# Patient Record
Sex: FEMALE | Race: BLACK OR AFRICAN AMERICAN | NOT HISPANIC OR LATINO | URBAN - METROPOLITAN AREA
[De-identification: names, ages, dates, MRNs, and addresses within clinical notes are randomized per-mention and may not be internally consistent; named-entity substitution may affect disease eponyms.]

---

## 2017-03-01 ENCOUNTER — EMERGENCY (EMERGENCY)
Facility: HOSPITAL | Age: 62
LOS: 1 days | Discharge: HOME | End: 2017-03-01
Attending: EMERGENCY MEDICINE | Admitting: EMERGENCY MEDICINE
Payer: COMMERCIAL

## 2017-03-01 VITALS
TEMPERATURE: 103 F | WEIGHT: 197.98 LBS | DIASTOLIC BLOOD PRESSURE: 63 MMHG | HEART RATE: 94 BPM | SYSTOLIC BLOOD PRESSURE: 100 MMHG | RESPIRATION RATE: 16 BRPM | OXYGEN SATURATION: 98 % | HEIGHT: 66 IN

## 2017-03-01 DIAGNOSIS — R50.9 FEVER, UNSPECIFIED: ICD-10-CM

## 2017-03-01 DIAGNOSIS — I25.2 OLD MYOCARDIAL INFARCTION: ICD-10-CM

## 2017-03-01 DIAGNOSIS — J11.00 INFLUENZA DUE TO UNIDENTIFIED INFLUENZA VIRUS WITH UNSPECIFIED TYPE OF PNEUMONIA: ICD-10-CM

## 2017-03-01 DIAGNOSIS — Z79.82 LONG TERM (CURRENT) USE OF ASPIRIN: ICD-10-CM

## 2017-03-01 PROCEDURE — 99284 EMERGENCY DEPT VISIT MOD MDM: CPT | Mod: 25

## 2017-03-01 RX ORDER — ACETAMINOPHEN 500 MG
1000 TABLET ORAL ONCE
Qty: 0 | Refills: 0 | Status: COMPLETED | OUTPATIENT
Start: 2017-03-01 | End: 2017-03-02

## 2017-03-01 RX ORDER — ALBUTEROL 90 UG/1
2.5 AEROSOL, METERED ORAL
Qty: 0 | Refills: 0 | Status: COMPLETED | OUTPATIENT
Start: 2017-03-01 | End: 2017-03-02

## 2017-03-01 RX ORDER — SODIUM CHLORIDE 9 MG/ML
1000 INJECTION INTRAMUSCULAR; INTRAVENOUS; SUBCUTANEOUS ONCE
Qty: 0 | Refills: 0 | Status: COMPLETED | OUTPATIENT
Start: 2017-03-01 | End: 2017-03-01

## 2017-03-01 NOTE — ED ADULT NURSE NOTE - DISCHARGE TEACHING
pt to take zithromax and tamiflu as directed and us inhaler as directed for sob/wheezing, pt to return if s/s worsen.

## 2017-03-01 NOTE — ED PROVIDER NOTE - PLAN OF CARE
Please take Tamiflu for 5 days as instructed.  In addition, please take Azithromycin for 4 more days as instructed.  Please follow up with your PMD within 24-48 hrs for further evaluation.  If you experience any worsening symptoms, please report to the ED ASAP. Please take Tamiflu for 5 days as instructed.  In addition, please take Azithromycin for 4 more days as instructed.  Please use the albuterol inhaler every 4-6hrs as needed for respiratory symptoms.  Please follow up with your PMD within 24-48 hrs for further evaluation.  If you experience any worsening symptoms, please report to the ED ASAP.

## 2017-03-01 NOTE — ED PROVIDER NOTE - OBJECTIVE STATEMENT
61M PMH CAD s/p MI, cardiomyopathy p/w 3 day hx of fever, chills.  Also reports prod cough (brown sputum), SOB, sore throat, congestion, chest pain, nausea, inc. urinary frequency.  No recent travel or sick contacts.  Has not taken anything for symptom control.

## 2017-03-01 NOTE — ED ADULT NURSE NOTE - OBJECTIVE STATEMENT
61 y,o female pmh MI, TIA, and cardiomyopathy c/o fever, chills, cough and congestion x 3 days. pt states she has had fever, nausea, chills, congestion and cough for approx 3 days now with sob. describes cough as productive with dark brown sputum and occasional sob. states her chest hurts d/t the increased amount of coughing. pt did not receive flu shot, arrives to ED with oral temp of 102.9, did not take anything for the fever today. congested cough noted upon assessment. pt states she has had + sick contacts at work over the passed week. denies any difficulty breathing, vomiting, diarrhea, decreased PO intake, dizziness, or burning upon urination. pt placed on droplet precautions, flu swab obtained. IV tylenol administered. pending labs. safety and fall precautions maintained.

## 2017-03-01 NOTE — ED PROVIDER NOTE - ATTENDING CONTRIBUTION TO CARE
Patient presenting c/o generalized malaise, fevers, cough productive of brown sputum, sore throat, myalgias.  Tried tylenol this AM with some relief.  Reporting sick co-workers.  Symptoms ongoing x3 days.    On exam patient febrile but VS otherwise WNL.  Ill appearing but non toxic.  RRR S1/S2, slight expiratory wheezing with bronchospastic cough appreciated.  Abdomen soft, NT, ND.  No noted rashes.    History and exam suggestive of possible viral etiology, given fevers, age and underlying medical problems will check labs, CXR, RVP, treat symptomatically with APAP/albuterol, IVF and reassess.

## 2017-03-02 VITALS
TEMPERATURE: 98 F | OXYGEN SATURATION: 97 % | RESPIRATION RATE: 18 BRPM | HEART RATE: 78 BPM | DIASTOLIC BLOOD PRESSURE: 65 MMHG | SYSTOLIC BLOOD PRESSURE: 109 MMHG

## 2017-03-02 LAB
ALBUMIN SERPL ELPH-MCNC: 4.1 G/DL — SIGNIFICANT CHANGE UP (ref 3.3–5)
ALP SERPL-CCNC: 75 U/L — SIGNIFICANT CHANGE UP (ref 40–120)
ALT FLD-CCNC: 20 U/L RC — SIGNIFICANT CHANGE UP (ref 10–45)
ANION GAP SERPL CALC-SCNC: 17 MMOL/L — SIGNIFICANT CHANGE UP (ref 5–17)
AST SERPL-CCNC: 29 U/L — SIGNIFICANT CHANGE UP (ref 10–40)
BASOPHILS # BLD AUTO: 0 K/UL — SIGNIFICANT CHANGE UP (ref 0–0.2)
BILIRUB SERPL-MCNC: 0.3 MG/DL — SIGNIFICANT CHANGE UP (ref 0.2–1.2)
BUN SERPL-MCNC: 12 MG/DL — SIGNIFICANT CHANGE UP (ref 7–23)
CALCIUM SERPL-MCNC: 8.8 MG/DL — SIGNIFICANT CHANGE UP (ref 8.4–10.5)
CHLORIDE SERPL-SCNC: 96 MMOL/L — SIGNIFICANT CHANGE UP (ref 96–108)
CO2 SERPL-SCNC: 23 MMOL/L — SIGNIFICANT CHANGE UP (ref 22–31)
CREAT SERPL-MCNC: 0.89 MG/DL — SIGNIFICANT CHANGE UP (ref 0.5–1.3)
EOSINOPHIL # BLD AUTO: 0 K/UL — SIGNIFICANT CHANGE UP (ref 0–0.5)
FLUAV H1 2009 PAND RNA SPEC QL NAA+PROBE: DETECTED
GLUCOSE SERPL-MCNC: 115 MG/DL — HIGH (ref 70–99)
HCT VFR BLD CALC: 36.7 % — SIGNIFICANT CHANGE UP (ref 34.5–45)
HGB BLD-MCNC: 12.1 G/DL — SIGNIFICANT CHANGE UP (ref 11.5–15.5)
LYMPHOCYTES # BLD AUTO: 1.1 K/UL — SIGNIFICANT CHANGE UP (ref 1–3.3)
LYMPHOCYTES # BLD AUTO: 21 % — SIGNIFICANT CHANGE UP (ref 13–44)
MCHC RBC-ENTMCNC: 29.1 PG — SIGNIFICANT CHANGE UP (ref 27–34)
MCHC RBC-ENTMCNC: 33.1 GM/DL — SIGNIFICANT CHANGE UP (ref 32–36)
MCV RBC AUTO: 87.9 FL — SIGNIFICANT CHANGE UP (ref 80–100)
MONOCYTES # BLD AUTO: 0.7 K/UL — SIGNIFICANT CHANGE UP (ref 0–0.9)
MONOCYTES NFR BLD AUTO: 12 % — SIGNIFICANT CHANGE UP (ref 2–14)
NEUTROPHILS # BLD AUTO: 4.4 K/UL — SIGNIFICANT CHANGE UP (ref 1.8–7.4)
NEUTROPHILS NFR BLD AUTO: 67 % — SIGNIFICANT CHANGE UP (ref 43–77)
PLATELET # BLD AUTO: 255 K/UL — SIGNIFICANT CHANGE UP (ref 150–400)
POTASSIUM SERPL-MCNC: 3.9 MMOL/L — SIGNIFICANT CHANGE UP (ref 3.5–5.3)
POTASSIUM SERPL-SCNC: 3.9 MMOL/L — SIGNIFICANT CHANGE UP (ref 3.5–5.3)
PROT SERPL-MCNC: 7.3 G/DL — SIGNIFICANT CHANGE UP (ref 6–8.3)
RAPID RVP RESULT: DETECTED
RBC # BLD: 4.17 M/UL — SIGNIFICANT CHANGE UP (ref 3.8–5.2)
RBC # FLD: 11.6 % — SIGNIFICANT CHANGE UP (ref 10.3–14.5)
SODIUM SERPL-SCNC: 136 MMOL/L — SIGNIFICANT CHANGE UP (ref 135–145)
WBC # BLD: 6.2 K/UL — SIGNIFICANT CHANGE UP (ref 3.8–10.5)
WBC # FLD AUTO: 6.2 K/UL — SIGNIFICANT CHANGE UP (ref 3.8–10.5)

## 2017-03-02 PROCEDURE — 85027 COMPLETE CBC AUTOMATED: CPT

## 2017-03-02 PROCEDURE — 71020: CPT | Mod: 26

## 2017-03-02 PROCEDURE — 99284 EMERGENCY DEPT VISIT MOD MDM: CPT | Mod: 25

## 2017-03-02 PROCEDURE — 71046 X-RAY EXAM CHEST 2 VIEWS: CPT

## 2017-03-02 PROCEDURE — 87798 DETECT AGENT NOS DNA AMP: CPT

## 2017-03-02 PROCEDURE — 80053 COMPREHEN METABOLIC PANEL: CPT

## 2017-03-02 PROCEDURE — 87581 M.PNEUMON DNA AMP PROBE: CPT

## 2017-03-02 PROCEDURE — 87633 RESP VIRUS 12-25 TARGETS: CPT

## 2017-03-02 PROCEDURE — 96374 THER/PROPH/DIAG INJ IV PUSH: CPT

## 2017-03-02 PROCEDURE — 94640 AIRWAY INHALATION TREATMENT: CPT

## 2017-03-02 PROCEDURE — 87486 CHLMYD PNEUM DNA AMP PROBE: CPT

## 2017-03-02 RX ORDER — AZITHROMYCIN 500 MG/1
500 TABLET, FILM COATED ORAL ONCE
Qty: 0 | Refills: 0 | Status: COMPLETED | OUTPATIENT
Start: 2017-03-02 | End: 2017-03-02

## 2017-03-02 RX ORDER — AZITHROMYCIN 500 MG/1
1 TABLET, FILM COATED ORAL
Qty: 4 | Refills: 0 | OUTPATIENT
Start: 2017-03-02 | End: 2017-03-06

## 2017-03-02 RX ORDER — ALBUTEROL 90 UG/1
1 AEROSOL, METERED ORAL ONCE
Qty: 0 | Refills: 0 | Status: COMPLETED | OUTPATIENT
Start: 2017-03-02 | End: 2017-03-02

## 2017-03-02 RX ADMIN — Medication 1000 MILLIGRAM(S): at 02:10

## 2017-03-02 RX ADMIN — AZITHROMYCIN 500 MILLIGRAM(S): 500 TABLET, FILM COATED ORAL at 02:10

## 2017-03-02 RX ADMIN — ALBUTEROL 2.5 MILLIGRAM(S): 90 AEROSOL, METERED ORAL at 00:14

## 2017-03-02 RX ADMIN — ALBUTEROL 1 PUFF(S): 90 AEROSOL, METERED ORAL at 02:11

## 2017-03-02 RX ADMIN — ALBUTEROL 2.5 MILLIGRAM(S): 90 AEROSOL, METERED ORAL at 02:02

## 2017-03-02 RX ADMIN — Medication 75 MILLIGRAM(S): at 02:10

## 2017-03-02 RX ADMIN — Medication 400 MILLIGRAM(S): at 00:14

## 2017-03-02 RX ADMIN — ALBUTEROL 2.5 MILLIGRAM(S): 90 AEROSOL, METERED ORAL at 01:30

## 2017-03-02 RX ADMIN — SODIUM CHLORIDE 2000 MILLILITER(S): 9 INJECTION INTRAMUSCULAR; INTRAVENOUS; SUBCUTANEOUS at 00:14

## 2017-03-02 NOTE — ED ADULT NURSE REASSESSMENT NOTE - NS ED NURSE REASSESS COMMENT FT1
pt afebrile after receiving IV tylenol and liter of NS. current oral temp 98.5F. denies cp, sob, dizziness, nausea, or weakness. pt Flu + with chest xray positive for pneumonia. pt medicated with 500mg of Zithromax PO and 75mg of Tamiflu. to be discharged with albuterol inhaler, educated pt on use with verbal and return demonstration provided. VS stable.

## 2017-11-13 ENCOUNTER — INPATIENT (INPATIENT)
Facility: HOSPITAL | Age: 62
LOS: 1 days | Discharge: HOME | DRG: 176 | End: 2017-11-15
Attending: HOSPITALIST | Admitting: HOSPITALIST
Payer: COMMERCIAL

## 2017-11-13 VITALS
DIASTOLIC BLOOD PRESSURE: 97 MMHG | WEIGHT: 189.6 LBS | TEMPERATURE: 98 F | SYSTOLIC BLOOD PRESSURE: 165 MMHG | RESPIRATION RATE: 18 BRPM | OXYGEN SATURATION: 98 % | HEART RATE: 79 BPM

## 2017-11-13 LAB
ALBUMIN SERPL ELPH-MCNC: 4.6 G/DL — SIGNIFICANT CHANGE UP (ref 3.3–5)
ALP SERPL-CCNC: 86 U/L — SIGNIFICANT CHANGE UP (ref 40–120)
ALT FLD-CCNC: 17 U/L RC — SIGNIFICANT CHANGE UP (ref 10–45)
ANION GAP SERPL CALC-SCNC: 10 MMOL/L — SIGNIFICANT CHANGE UP (ref 5–17)
APTT BLD: 30.3 SEC — SIGNIFICANT CHANGE UP (ref 27.5–37.4)
AST SERPL-CCNC: 21 U/L — SIGNIFICANT CHANGE UP (ref 10–40)
BASOPHILS # BLD AUTO: 0 K/UL — SIGNIFICANT CHANGE UP (ref 0–0.2)
BASOPHILS NFR BLD AUTO: 0.4 % — SIGNIFICANT CHANGE UP (ref 0–2)
BILIRUB SERPL-MCNC: 0.3 MG/DL — SIGNIFICANT CHANGE UP (ref 0.2–1.2)
BUN SERPL-MCNC: 13 MG/DL — SIGNIFICANT CHANGE UP (ref 7–23)
CALCIUM SERPL-MCNC: 9.1 MG/DL — SIGNIFICANT CHANGE UP (ref 8.4–10.5)
CHLORIDE SERPL-SCNC: 105 MMOL/L — SIGNIFICANT CHANGE UP (ref 96–108)
CK MB BLD-MCNC: 0.8 % — SIGNIFICANT CHANGE UP (ref 0–3.5)
CK MB CFR SERPL CALC: 1.2 NG/ML — SIGNIFICANT CHANGE UP (ref 0–3.8)
CK SERPL-CCNC: 160 U/L — SIGNIFICANT CHANGE UP (ref 25–170)
CO2 SERPL-SCNC: 28 MMOL/L — SIGNIFICANT CHANGE UP (ref 22–31)
CREAT SERPL-MCNC: 0.69 MG/DL — SIGNIFICANT CHANGE UP (ref 0.5–1.3)
D DIMER BLD IA.RAPID-MCNC: 391 NG/ML DDU — HIGH
EOSINOPHIL # BLD AUTO: 0.1 K/UL — SIGNIFICANT CHANGE UP (ref 0–0.5)
EOSINOPHIL NFR BLD AUTO: 1.2 % — SIGNIFICANT CHANGE UP (ref 0–6)
GLUCOSE SERPL-MCNC: 77 MG/DL — SIGNIFICANT CHANGE UP (ref 70–99)
HCT VFR BLD CALC: 36.2 % — SIGNIFICANT CHANGE UP (ref 34.5–45)
HGB BLD-MCNC: 11.8 G/DL — SIGNIFICANT CHANGE UP (ref 11.5–15.5)
INR BLD: 1.05 RATIO — SIGNIFICANT CHANGE UP (ref 0.88–1.16)
LYMPHOCYTES # BLD AUTO: 2.3 K/UL — SIGNIFICANT CHANGE UP (ref 1–3.3)
LYMPHOCYTES # BLD AUTO: 43.1 % — SIGNIFICANT CHANGE UP (ref 13–44)
MCHC RBC-ENTMCNC: 30.1 PG — SIGNIFICANT CHANGE UP (ref 27–34)
MCHC RBC-ENTMCNC: 32.5 GM/DL — SIGNIFICANT CHANGE UP (ref 32–36)
MCV RBC AUTO: 92.7 FL — SIGNIFICANT CHANGE UP (ref 80–100)
MONOCYTES # BLD AUTO: 0.7 K/UL — SIGNIFICANT CHANGE UP (ref 0–0.9)
MONOCYTES NFR BLD AUTO: 12.6 % — SIGNIFICANT CHANGE UP (ref 2–14)
NEUTROPHILS # BLD AUTO: 2.3 K/UL — SIGNIFICANT CHANGE UP (ref 1.8–7.4)
NEUTROPHILS NFR BLD AUTO: 42.7 % — LOW (ref 43–77)
NT-PROBNP SERPL-SCNC: 238 PG/ML — SIGNIFICANT CHANGE UP (ref 0–300)
PLATELET # BLD AUTO: 249 K/UL — SIGNIFICANT CHANGE UP (ref 150–400)
POTASSIUM SERPL-MCNC: 4.3 MMOL/L — SIGNIFICANT CHANGE UP (ref 3.5–5.3)
POTASSIUM SERPL-SCNC: 4.3 MMOL/L — SIGNIFICANT CHANGE UP (ref 3.5–5.3)
PROT SERPL-MCNC: 7.7 G/DL — SIGNIFICANT CHANGE UP (ref 6–8.3)
PROTHROM AB SERPL-ACNC: 11.4 SEC — SIGNIFICANT CHANGE UP (ref 9.8–12.7)
RBC # BLD: 3.91 M/UL — SIGNIFICANT CHANGE UP (ref 3.8–5.2)
RBC # FLD: 12 % — SIGNIFICANT CHANGE UP (ref 10.3–14.5)
SODIUM SERPL-SCNC: 143 MMOL/L — SIGNIFICANT CHANGE UP (ref 135–145)
TROPONIN T SERPL-MCNC: <0.01 NG/ML — SIGNIFICANT CHANGE UP (ref 0–0.06)
WBC # BLD: 5.3 K/UL — SIGNIFICANT CHANGE UP (ref 3.8–10.5)
WBC # FLD AUTO: 5.3 K/UL — SIGNIFICANT CHANGE UP (ref 3.8–10.5)

## 2017-11-13 PROCEDURE — 71275 CT ANGIOGRAPHY CHEST: CPT | Mod: 26

## 2017-11-13 PROCEDURE — 71020: CPT | Mod: 26

## 2017-11-13 PROCEDURE — 93010 ELECTROCARDIOGRAM REPORT: CPT | Mod: 59

## 2017-11-13 PROCEDURE — 70450 CT HEAD/BRAIN W/O DYE: CPT | Mod: 26

## 2017-11-13 PROCEDURE — 99291 CRITICAL CARE FIRST HOUR: CPT | Mod: 25

## 2017-11-13 NOTE — ED ADULT NURSE NOTE - OBJECTIVE STATEMENT
Pt 62 yr old F ambulatory to ed c/o SOB & sternal chest pain radiating to R side. Pt states she felt SOB when she got out of bed yesterday morning. Pt AOX3 well appearing, presents with non-labored breathing. denies ha, n/v/d, abdominal pain, f/c, urinary symptoms, hematuria, diziness. Pt 62 yr old F ambulatory to ed c/o SOB & sternal chest pain radiating to R side. Pt states she felt SOB when she got out of bed yesterday morning. PMH of cardiomyopathy, MI, TIA. Pt AOX3 well appearing, presents with non-labored breathing, Neuro WNL. Pt states she feels numbness in her R foot and pain in the R leg. She denies ha, n/v/d, abdominal pain, f/c, urinary symptoms, hematuria, dizziness. Pt resting comfortably with VSS, no complaints at this time. Patient's bed in the lowest position, explained plan of care to patient. Will continue to monitor.

## 2017-11-13 NOTE — ED PROVIDER NOTE - MEDICAL DECISION MAKING DETAILS
63 y/o F pt with PMHx of cardiomyopathy presents to the ED with SOB since yesterday with associated right sided pain, subjective weakness, RLE swelling. Notes lump sensation in epigastrium. Concern for with hx of cardiomyopathy: CAD, ACS, new onset CHF, possible but low suspicion of ICH with subjective weakness, low extremity DVT with asymmetrical swelling. Plan: CT head, CXR, cardiac enzymes, EKG, US of RLE and heart, reassess

## 2017-11-13 NOTE — ED PROVIDER NOTE - OBJECTIVE STATEMENT
63 y/o F pt with PMHx of cardiomyopathy, MI, TIA, no significant PSHx, FHx of CAD (father, passed at 64) c/o SOB since yesterday with fatigue and dizziness. Notes pressure like CP at the substernal area described as a lump. Notes that on the onset of the sx she was at [employer] and then felt onset of sx today when she got up to go to work. Also notes numbness to toes with pain to the right leg. States that she feels as if her right side is in pain. Pt usually takes enalapril but no longer takes it. Denies fever, chills, hx of blood clots, recent travel or any other complaints. [facility]  Internal medicine: Dr. Severo Kelley

## 2017-11-13 NOTE — ED PROVIDER NOTE - CONDUCTED A DETAILED DISCUSSION WITH PATIENT AND/OR GUARDIAN REGARDING, MDM
lab results/return to ED if symptoms worsen, persist or questions arise/need to admit/radiology results

## 2017-11-13 NOTE — ED PROVIDER NOTE - PROGRESS NOTE DETAILS
US showed injection fracture 25% and negative DVT study noted that CT head was normal noted d-dimer positive, will CT chest and repeat troponin at 11:30

## 2017-11-13 NOTE — ED PROVIDER NOTE - NS_ ATTENDINGSCRIBEDETAILS _ED_A_ED_FT
Harman Albrecht MD note: The scribe's documentation has been prepared under my direction and personally reviewed by me.  I confirm that the note above accurately reflects my work, treatment, procedures, and medical decision making.

## 2017-11-13 NOTE — ED PROVIDER NOTE - CHPI ED SYMPTOMS POS
Group Topic: Symptom Management    Start Time: 9:00 AM  End Time: 10:00 AM    Focus: nursing check in group  Number in attendance: 11PHP, 12 RTC    Patient Response: Interested in topic   fatigue, numbness, pain, dizziness/SHORTNESS OF BREATH/CHEST PAIN

## 2017-11-13 NOTE — ED ADULT NURSE NOTE - CHPI ED SYMPTOMS NEG
no cough/no nausea/no fever/no chills/no vomiting/no diaphoresis/no syncope/no dizziness/no back pain

## 2017-11-14 DIAGNOSIS — G45.9 TRANSIENT CEREBRAL ISCHEMIC ATTACK, UNSPECIFIED: ICD-10-CM

## 2017-11-14 DIAGNOSIS — I26.99 OTHER PULMONARY EMBOLISM WITHOUT ACUTE COR PULMONALE: ICD-10-CM

## 2017-11-14 DIAGNOSIS — I42.9 CARDIOMYOPATHY, UNSPECIFIED: ICD-10-CM

## 2017-11-14 DIAGNOSIS — Z29.9 ENCOUNTER FOR PROPHYLACTIC MEASURES, UNSPECIFIED: ICD-10-CM

## 2017-11-14 LAB
ANION GAP SERPL CALC-SCNC: 12 MMOL/L — SIGNIFICANT CHANGE UP (ref 5–17)
APTT BLD: > 200 SEC (ref 27.5–37.4)
APTT BLD: > 200 SEC (ref 27.5–37.4)
APTT BLD: >200 SEC — CRITICAL HIGH (ref 27.5–37.4)
BASOPHILS # BLD AUTO: 0 K/UL — SIGNIFICANT CHANGE UP (ref 0–0.2)
BASOPHILS NFR BLD AUTO: 0 % — SIGNIFICANT CHANGE UP (ref 0–2)
BUN SERPL-MCNC: 8 MG/DL — SIGNIFICANT CHANGE UP (ref 7–23)
CALCIUM SERPL-MCNC: 9.1 MG/DL — SIGNIFICANT CHANGE UP (ref 8.4–10.5)
CHLORIDE SERPL-SCNC: 103 MMOL/L — SIGNIFICANT CHANGE UP (ref 96–108)
CO2 SERPL-SCNC: 25 MMOL/L — SIGNIFICANT CHANGE UP (ref 22–31)
CREAT SERPL-MCNC: 0.74 MG/DL — SIGNIFICANT CHANGE UP (ref 0.5–1.3)
EOSINOPHIL # BLD AUTO: 0.12 K/UL — SIGNIFICANT CHANGE UP (ref 0–0.5)
EOSINOPHIL NFR BLD AUTO: 2.2 % — SIGNIFICANT CHANGE UP (ref 0–6)
GLUCOSE SERPL-MCNC: 99 MG/DL — SIGNIFICANT CHANGE UP (ref 70–99)
HCT VFR BLD CALC: 35.7 % — SIGNIFICANT CHANGE UP (ref 34.5–45)
HCT VFR BLD CALC: 35.8 % — SIGNIFICANT CHANGE UP (ref 34.5–45)
HGB BLD-MCNC: 11.3 G/DL — LOW (ref 11.5–15.5)
HGB BLD-MCNC: 11.9 G/DL — SIGNIFICANT CHANGE UP (ref 11.5–15.5)
IMM GRANULOCYTES NFR BLD AUTO: 0 % — SIGNIFICANT CHANGE UP (ref 0–1.5)
LYMPHOCYTES # BLD AUTO: 2.76 K/UL — SIGNIFICANT CHANGE UP (ref 1–3.3)
LYMPHOCYTES # BLD AUTO: 51.2 % — HIGH (ref 13–44)
MAGNESIUM SERPL-MCNC: 2.2 MG/DL — SIGNIFICANT CHANGE UP (ref 1.6–2.6)
MCHC RBC-ENTMCNC: 28.3 PG — SIGNIFICANT CHANGE UP (ref 27–34)
MCHC RBC-ENTMCNC: 30.7 PG — SIGNIFICANT CHANGE UP (ref 27–34)
MCHC RBC-ENTMCNC: 31.6 GM/DL — LOW (ref 32–36)
MCHC RBC-ENTMCNC: 33.3 GM/DL — SIGNIFICANT CHANGE UP (ref 32–36)
MCV RBC AUTO: 89.5 FL — SIGNIFICANT CHANGE UP (ref 80–100)
MCV RBC AUTO: 92.3 FL — SIGNIFICANT CHANGE UP (ref 80–100)
MONOCYTES # BLD AUTO: 0.58 K/UL — SIGNIFICANT CHANGE UP (ref 0–0.9)
MONOCYTES NFR BLD AUTO: 10.8 % — SIGNIFICANT CHANGE UP (ref 2–14)
NEUTROPHILS # BLD AUTO: 1.93 K/UL — SIGNIFICANT CHANGE UP (ref 1.8–7.4)
NEUTROPHILS NFR BLD AUTO: 35.8 % — LOW (ref 43–77)
PLATELET # BLD AUTO: 237 K/UL — SIGNIFICANT CHANGE UP (ref 150–400)
PLATELET # BLD AUTO: 251 K/UL — SIGNIFICANT CHANGE UP (ref 150–400)
POTASSIUM SERPL-MCNC: 4.4 MMOL/L — SIGNIFICANT CHANGE UP (ref 3.5–5.3)
POTASSIUM SERPL-SCNC: 4.4 MMOL/L — SIGNIFICANT CHANGE UP (ref 3.5–5.3)
RBC # BLD: 3.86 M/UL — SIGNIFICANT CHANGE UP (ref 3.8–5.2)
RBC # BLD: 4 M/UL — SIGNIFICANT CHANGE UP (ref 3.8–5.2)
RBC # FLD: 11.9 % — SIGNIFICANT CHANGE UP (ref 10.3–14.5)
RBC # FLD: 13.6 % — SIGNIFICANT CHANGE UP (ref 10.3–14.5)
SODIUM SERPL-SCNC: 140 MMOL/L — SIGNIFICANT CHANGE UP (ref 135–145)
TROPONIN T SERPL-MCNC: <0.01 NG/ML — SIGNIFICANT CHANGE UP (ref 0–0.06)
WBC # BLD: 5.3 K/UL — SIGNIFICANT CHANGE UP (ref 3.8–10.5)
WBC # BLD: 5.39 K/UL — SIGNIFICANT CHANGE UP (ref 3.8–10.5)
WBC # FLD AUTO: 5.3 K/UL — SIGNIFICANT CHANGE UP (ref 3.8–10.5)
WBC # FLD AUTO: 5.39 K/UL — SIGNIFICANT CHANGE UP (ref 3.8–10.5)

## 2017-11-14 PROCEDURE — 99223 1ST HOSP IP/OBS HIGH 75: CPT

## 2017-11-14 PROCEDURE — 93308 TTE F-UP OR LMTD: CPT | Mod: 26

## 2017-11-14 PROCEDURE — 93971 EXTREMITY STUDY: CPT | Mod: 26

## 2017-11-14 PROCEDURE — 93970 EXTREMITY STUDY: CPT | Mod: 26

## 2017-11-14 RX ORDER — APIXABAN 2.5 MG/1
2 TABLET, FILM COATED ORAL
Qty: 28 | Refills: 0 | OUTPATIENT
Start: 2017-11-14 | End: 2017-11-21

## 2017-11-14 RX ORDER — HEPARIN SODIUM 5000 [USP'U]/ML
1000 INJECTION INTRAVENOUS; SUBCUTANEOUS
Qty: 25000 | Refills: 0 | Status: DISCONTINUED | OUTPATIENT
Start: 2017-11-14 | End: 2017-11-15

## 2017-11-14 RX ORDER — HEPARIN SODIUM 5000 [USP'U]/ML
7000 INJECTION INTRAVENOUS; SUBCUTANEOUS ONCE
Qty: 0 | Refills: 0 | Status: COMPLETED | OUTPATIENT
Start: 2017-11-14 | End: 2017-11-14

## 2017-11-14 RX ORDER — HEPARIN SODIUM 5000 [USP'U]/ML
3500 INJECTION INTRAVENOUS; SUBCUTANEOUS EVERY 6 HOURS
Qty: 0 | Refills: 0 | Status: DISCONTINUED | OUTPATIENT
Start: 2017-11-14 | End: 2017-11-15

## 2017-11-14 RX ORDER — HEPARIN SODIUM 5000 [USP'U]/ML
6500 INJECTION INTRAVENOUS; SUBCUTANEOUS EVERY 6 HOURS
Qty: 0 | Refills: 0 | Status: DISCONTINUED | OUTPATIENT
Start: 2017-11-14 | End: 2017-11-15

## 2017-11-14 RX ORDER — HEPARIN SODIUM 5000 [USP'U]/ML
INJECTION INTRAVENOUS; SUBCUTANEOUS
Qty: 25000 | Refills: 0 | Status: DISCONTINUED | OUTPATIENT
Start: 2017-11-14 | End: 2017-11-14

## 2017-11-14 RX ORDER — HEPARIN SODIUM 5000 [USP'U]/ML
7000 INJECTION INTRAVENOUS; SUBCUTANEOUS EVERY 6 HOURS
Qty: 0 | Refills: 0 | Status: DISCONTINUED | OUTPATIENT
Start: 2017-11-14 | End: 2017-11-15

## 2017-11-14 RX ORDER — HEPARIN SODIUM 5000 [USP'U]/ML
3000 INJECTION INTRAVENOUS; SUBCUTANEOUS EVERY 6 HOURS
Qty: 0 | Refills: 0 | Status: DISCONTINUED | OUTPATIENT
Start: 2017-11-14 | End: 2017-11-15

## 2017-11-14 RX ADMIN — HEPARIN SODIUM 1600 UNIT(S)/HR: 5000 INJECTION INTRAVENOUS; SUBCUTANEOUS at 01:26

## 2017-11-14 RX ADMIN — HEPARIN SODIUM 5000 UNIT(S): 5000 INJECTION INTRAVENOUS; SUBCUTANEOUS at 01:20

## 2017-11-14 RX ADMIN — HEPARIN SODIUM 1300 UNIT(S)/HR: 5000 INJECTION INTRAVENOUS; SUBCUTANEOUS at 11:10

## 2017-11-14 RX ADMIN — HEPARIN SODIUM 1000 UNIT(S)/HR: 5000 INJECTION INTRAVENOUS; SUBCUTANEOUS at 19:28

## 2017-11-14 RX ADMIN — HEPARIN SODIUM 0 UNIT(S)/HR: 5000 INJECTION INTRAVENOUS; SUBCUTANEOUS at 10:08

## 2017-11-14 NOTE — H&P ADULT - NSHPPHYSICALEXAM_GEN_ALL_CORE
Vital Signs Last 24 Hrs  T(C): 37 (11-14-17 @ 02:27), Max: 37 (11-14-17 @ 02:27)  T(F): 98.6 (11-14-17 @ 02:27), Max: 98.6 (11-14-17 @ 02:27)  HR: 73 (11-14-17 @ 02:27) (60 - 79)  BP: 133/74 (11-14-17 @ 02:27) (133/74 - 165/97)  BP(mean): --  RR: 16 (11-14-17 @ 02:27) (16 - 18)  SpO2: 98% (11-14-17 @ 02:27) (98% - 99%)

## 2017-11-14 NOTE — CONSULT NOTE ADULT - ASSESSMENT
Unprovoked PE with hemodynamic stability  Cardiomyopathy  CAD  TIA    REC    Indefinite AC as tolerated  TTE pending  r/o LE DVT - Bilateral LE dopplers pending  Malignancy screening as outpatient
62 F h/o nonischemic cardiomyopathy with EF 45% (cath 2008 - normal coronaries), TIA, prediabetes, obesity, dyslipidemia a/w IGLESIAS and pleuritic CP and found to have PE's

## 2017-11-14 NOTE — DISCHARGE NOTE ADULT - PATIENT PORTAL LINK FT
“You can access the [employer] Patient Portal, offered by [facility], by registering with the following website: [email or web address]”

## 2017-11-14 NOTE — H&P ADULT - PROBLEM SELECTOR PLAN 1
Imaging and cardiac biomarkers negative for submassive PE. Unclear inciting factor as pt has no personal or family hx of blood clots. TIA possibly related? Pt denies any routine cancer screening in past. It is unclear how reliable pt is with follow up as she states the last time she saw Dr. Kelley was more than 1 year ago.  -Would convert pt to oral anti-cogaulation (need to work out logistics with pharmacy) as pt endorsing desire to leave today regardless of test results  -Needs close follow up with PMD if pt unwilling to stay past today  -Likely needs additional Hem work up and routine cancer screening which can be provided by PMD after discharge if pt can demonstrate good follow up.  -Trend PTT, attempting to obtain dedicated TTE

## 2017-11-14 NOTE — DISCHARGE NOTE ADULT - PLAN OF CARE
Take your anticoagulation directed. Follow-up with Dr. Kelley for outpatient Hematology consult and for further monitoring   Follow up with your health care provider within one week. Call for appointment.  If you develop shortness of breath or if your shortness of breath worsens call your Health Care Provider or go to the Emergency Department.

## 2017-11-14 NOTE — H&P ADULT - PROBLEM SELECTOR PLAN 2
Bedside TTE in ER with EF 20%. Pt endorses history of cardiomyopathy but cannot provide additional collateral beyond it was diagnosed many years ago. Does not take any medications for it and does not have a primary cardiologist.  -Will attempt formal TTE in AM, pt insistent on leaving today.

## 2017-11-14 NOTE — H&P ADULT - FAMILY HISTORY
Father  Still living? Unknown  Family history of coronary artery disease, Age at diagnosis: Age Unknown     Mother  Still living? Unknown  Family history of diabetes mellitus, Age at diagnosis: Age Unknown

## 2017-11-14 NOTE — DISCHARGE NOTE ADULT - MEDICATION SUMMARY - MEDICATIONS TO STOP TAKING
I will STOP taking the medications listed below when I get home from the hospital:    oseltamivir 75 mg oral capsule  -- 1 cap(s) by mouth 2 times a day MDD:2 caps  -- Check with your doctor before becoming pregnant.  Finish all this medication unless otherwise directed by prescriber.    azithromycin 250 mg oral tablet  -- 1 tab(s) by mouth once a day MDD:1 tab  -- Do not take dairy products, antacids, or iron preparations within one hour of this medication.  Finish all this medication unless otherwise directed by prescriber.

## 2017-11-14 NOTE — DISCHARGE NOTE ADULT - MEDICATION SUMMARY - MEDICATIONS TO TAKE
I will START or STAY ON the medications listed below when I get home from the hospital:    rivaroxaban 15 mg oral tablet  -- 1 tab(s) by mouth 2 times a day  -- Indication: For Pulmonary embolism and infarction    rivaroxaban 20 mg oral tablet  -- 1 tab(s) by mouth every 24 hours (  Start date 12/6/17)   -- Indication: For Prophylactic measure

## 2017-11-14 NOTE — H&P ADULT - NSHPLABSRESULTS_GEN_ALL_CORE
I have reviewed the labs, imaging and ekg. EKG with NSR HR 69, QTc 467, no significant ST segment changes

## 2017-11-14 NOTE — CONSULT NOTE ADULT - SUBJECTIVE AND OBJECTIVE BOX
[facility] Cardiology Consult  _________________________    CC: CP      HPI:  62 F h/o cardiomyopathy, CAD, TIA a/w chest pain and dyspnea x 3 days. She has felt generalized weakness and IGLESIAS x 3 days. She has also had midsternal chest pain which is worsened when taking a deep breath but not associated with exertion.       PAST MEDICAL & SURGICAL HISTORY:  TIA (transient ischemic attack)  Myocardial infarction  Cardiomyopathy  No significant past surgical history      MEDICATIONS  (STANDING):  heparin  Infusion.  Unit(s)/Hr (16 mL/Hr) IV Continuous <Continuous>    MEDICATIONS  (PRN):  heparin  Injectable 7000 Unit(s) IV Push every 6 hours PRN For aPTT less than 40  heparin  Injectable 3500 Unit(s) IV Push every 6 hours PRN For aPTT between 40 - 57      Allergies    No Known Allergies    Intolerances        Social Histroy: Tobacco- , ETOH-, Illicit Drugs-    T(C): 36.7 (11-14-17 @ 07:38), Max: 37 (11-14-17 @ 02:27)  HR: 60 (11-14-17 @ 07:38) (60 - 79)  BP: 143/84 (11-14-17 @ 07:38) (133/74 - 165/97)  RR: 18 (11-14-17 @ 07:38) (16 - 18)  SpO2: 100% (11-14-17 @ 07:38) (98% - 100%)  I&O's Summary      Review of Systems:  Constitutional: [ ] Fever [ ] Chills [ ] Fatigue [ ] Weight change   HEENT: [ ] Blurred vision [ ] Eye Pain [ ] Headache [ ] Runny nose [ ] Sore Throat   Respiratory: [ ] Cough [ ] Wheezing [ ] Shortness of breath  Cardiovascular: [ ] Chest Pain [ ] Palpitations [ ] IGLESIAS [ ] PND [ ] Orthopnea  Gastrointestinal: [ ] Abdominal Pain [ ] Diarrhea [ ] Constipation [ ] Hemorrhoids [ ] Nausea [ ] Vomiting  Genitourinary: [ ] Nocturia [ ] Dysuria [ ] Incontinence  Extremities: [ ] Swelling [ ] Joint Pain  Neurologic: [ ] Focal deficit [ ] Paresthesias [ ] Syncope  Lymphatic: [ ] Swelling [ ] Lymphadenopathy   Skin: [ ] Rash [ ] Ecchymoses [ ] Wounds [ ] Lesions  Psychiatry: [ ] Depression [ ] Suicidal/Homicidal Ideation [ ] Anxiety [ ] Sleep Disturbances  [ ] 10 point review of systems is otherwise negative except as mentioned above            [ ]Unable to obtain    PHYSICAL EXAM:  GENERAL: Alert, NAD  NECK: Supple, No JVD, No carotid bruit.  CHEST/LUNG: Clear to auscultation bilaterally; No wheezes, rales, or rhonchi  HEART: S1 S2 normal, RRR,  No murmurs, rubs, or gallops  ABDOMEN: Soft, Nontender, Nondistended; Bowel sounds present  EXTREMITIES:  No LE edema.      LABS:                        11.9   5.3   )-----------( 237      ( 14 Nov 2017 08:14 )             35.7     11-13    143  |  105  |  13  ----------------------------<  77  4.3   |  28  |  0.69    Ca    9.1      13 Nov 2017 20:35    TPro  7.7  /  Alb  4.6  /  TBili  0.3  /  DBili  x   /  AST  21  /  ALT  17  /  AlkPhos  86  11-13    PT/INR - ( 13 Nov 2017 20:35 )   PT: 11.4 sec;   INR: 1.05 ratio         PTT - ( 14 Nov 2017 08:14 )  PTT:> 200 sec  CARDIAC MARKERS ( 13 Nov 2017 23:47 )  x     / <0.01 ng/mL / x     / x     / x      CARDIAC MARKERS ( 13 Nov 2017 20:35 )  x     / <0.01 ng/mL / 160 U/L / x     / 1.2 ng/mL      Serum Pro-Brain Natriuretic Peptide: 238 pg/mL (11-13-17 @ 20:35)          MEDICATIONS  (STANDING):  heparin  Infusion.  Unit(s)/Hr (16 mL/Hr) IV Continuous <Continuous>    MEDICATIONS  (PRN):  heparin  Injectable 7000 Unit(s) IV Push every 6 hours PRN For aPTT less than 40  heparin  Injectable 3500 Unit(s) IV Push every 6 hours PRN For aPTT between 40 - 57      RADIOLOGY & ADDITIONAL TESTS:    Cardiology testing:  EKG:    Echo:    Stress Testing:    Cath:    Telemetry: [facility] Cardiology Consult  _________________________    CC: CP      HPI:  62 F h/o nonischemic cardiomyopathy with EF 45% (cath 2008 - normal coronaries), TIA, prediabetes, obesity, dyslipidemia a/w chest pain and dyspnea x 3 days. She has felt generalized weakness and IGLESIAS x 3 days. She has also had midsternal chest pain which is worsened when taking a deep breath but not associated with exertion.     Cardiologist Dr. Sd Kelley.    PAST MEDICAL & SURGICAL HISTORY:  TIA (transient ischemic attack)  Myocardial infarction  Cardiomyopathy  No significant past surgical history      MEDICATIONS  (STANDING):  heparin  Infusion.  Unit(s)/Hr (16 mL/Hr) IV Continuous <Continuous>    MEDICATIONS  (PRN):  heparin  Injectable 7000 Unit(s) IV Push every 6 hours PRN For aPTT less than 40  heparin  Injectable 3500 Unit(s) IV Push every 6 hours PRN For aPTT between 40 - 57      Allergies    No Known Allergies    Intolerances        Social Histroy: Tobacco- , ETOH-, Illicit Drugs-    T(C): 36.7 (11-14-17 @ 07:38), Max: 37 (11-14-17 @ 02:27)  HR: 60 (11-14-17 @ 07:38) (60 - 79)  BP: 143/84 (11-14-17 @ 07:38) (133/74 - 165/97)  RR: 18 (11-14-17 @ 07:38) (16 - 18)  SpO2: 100% (11-14-17 @ 07:38) (98% - 100%)  I&O's Summary      Review of Systems:  Constitutional: [ ] Fever [ ] Chills [ ] Fatigue [ ] Weight change   HEENT: [ ] Blurred vision [ ] Eye Pain [ ] Headache [ ] Runny nose [ ] Sore Throat   Respiratory: [ ] Cough [ ] Wheezing [ ] Shortness of breath  Cardiovascular: [ ] Chest Pain [ ] Palpitations [ ] IGLESIAS [ ] PND [ ] Orthopnea  Gastrointestinal: [ ] Abdominal Pain [ ] Diarrhea [ ] Constipation [ ] Hemorrhoids [ ] Nausea [ ] Vomiting  Genitourinary: [ ] Nocturia [ ] Dysuria [ ] Incontinence  Extremities: [ ] Swelling [ ] Joint Pain  Neurologic: [ ] Focal deficit [ ] Paresthesias [ ] Syncope  Lymphatic: [ ] Swelling [ ] Lymphadenopathy   Skin: [ ] Rash [ ] Ecchymoses [ ] Wounds [ ] Lesions  Psychiatry: [ ] Depression [ ] Suicidal/Homicidal Ideation [ ] Anxiety [ ] Sleep Disturbances  [ ] 10 point review of systems is otherwise negative except as mentioned above            [ ]Unable to obtain    PHYSICAL EXAM:  GENERAL: Alert, NAD  NECK: Supple, No JVD, No carotid bruit.  CHEST/LUNG: Clear to auscultation bilaterally; No wheezes, rales, or rhonchi  HEART: S1 S2 normal, RRR,  No murmurs, rubs, or gallops  ABDOMEN: Soft, Nontender, Nondistended; Bowel sounds present  EXTREMITIES:  No LE edema.      LABS:                        11.9   5.3   )-----------( 237      ( 14 Nov 2017 08:14 )             35.7     11-13    143  |  105  |  13  ----------------------------<  77  4.3   |  28  |  0.69    Ca    9.1      13 Nov 2017 20:35    TPro  7.7  /  Alb  4.6  /  TBili  0.3  /  DBili  x   /  AST  21  /  ALT  17  /  AlkPhos  86  11-13    PT/INR - ( 13 Nov 2017 20:35 )   PT: 11.4 sec;   INR: 1.05 ratio         PTT - ( 14 Nov 2017 08:14 )  PTT:> 200 sec  CARDIAC MARKERS ( 13 Nov 2017 23:47 )  x     / <0.01 ng/mL / x     / x     / x      CARDIAC MARKERS ( 13 Nov 2017 20:35 )  x     / <0.01 ng/mL / 160 U/L / x     / 1.2 ng/mL      Serum Pro-Brain Natriuretic Peptide: 238 pg/mL (11-13-17 @ 20:35)          MEDICATIONS  (STANDING):  heparin  Infusion.  Unit(s)/Hr (16 mL/Hr) IV Continuous <Continuous>    MEDICATIONS  (PRN):  heparin  Injectable 7000 Unit(s) IV Push every 6 hours PRN For aPTT less than 40  heparin  Injectable 3500 Unit(s) IV Push every 6 hours PRN For aPTT between 40 - 57      RADIOLOGY & ADDITIONAL TESTS:    Cardiology testing:  EKG: NSR, LAE, PRWP, nonspecific T wave abnormality.    Outpatient echo 4/2016:   TDS  Mild LVE with mild to moderate global dysfunction (EF 45%).  Reduced LV diastolic compliance.  Grossly normal RV size and function.  Mild LAE  Mild MR and TR.    CTPA  1. Acute right lower lobe posterior segmental and subsegmental and left   lower lobe anterior subsegmental pulmonary emboli.  2. Peripheral opacities in the right lower lobe concerning for pulmonary   infarction versus atelectasis.

## 2017-11-14 NOTE — H&P ADULT - HISTORY OF PRESENT ILLNESS
62F w/ hx of cardiomyopathy, CAD no stents, TIA p/w chest pain and SOB. Pt states starting roughly 3 days ago she experienced worsening weakness and SOB on exertion. Pt works as an  and has not been moving around as much recently given increased work load? Starting roughly 2-3 days ago she also developed a midsternal chest pain which is worsened with deep breathing. These symptoms, worsened yesterday and when they did not improve today, she came to the ER for further evaluation. Pt also endorses some recent numbness of toes and pain of R thigh. She denies any recent travel, surgery, prolonged immobilization, family history of blood clots or cancer. Currently pt states her SOB and chest pain have improved significantly. Has not had routine cancer screening.    In ER: Given heparin gtt

## 2017-11-14 NOTE — PROVIDER CONTACT NOTE (CRITICAL VALUE NOTIFICATION) - ASSESSMENT
Heparin protocol followed held 1 hour, and restarted at 13 from 16 units per hour.  NP previously aware and already addressed.

## 2017-11-14 NOTE — DISCHARGE NOTE ADULT - CARE PLAN
Principal Discharge DX:	Pulmonary embolism and infarction  Goal:	Take your anticoagulation directed.  Instructions for follow-up, activity and diet:	Follow-up with Dr. Kelely for outpatient Hematology consult and for further monitoring   Follow up with your health care provider within one week. Call for appointment.  If you develop shortness of breath or if your shortness of breath worsens call your Health Care Provider or go to the Emergency Department.

## 2017-11-14 NOTE — CHART NOTE - NSCHARTNOTEFT_GEN_A_CORE
Pt seen and examined at bedside. Please refer to the H&P done today for details.    Case signed out to me by the hospitalist.  Pt seen and examined at bedside. Feels well.   No chest pain, no shortness of breath.   No overnight event.   No N/V/D. No abdominal pain.   Work as an . Have sedentary lifestyle.  Eager to go home to go back to work. Her boss is calling her.  Her boss doesn't know she is in the hospital.  Told the pt, we will write work excuse letters if needed.    Pending insurance check to see if Eliquis is covered.  otherwise, she will need Lovenox bridge + coumadin.  No apparent RV strain on CTA chest.    Card and pulm consulted.  LE dopplers and official TTE pending.    Pt promised she will make an appointment with her PCP Dr. Severo Kelley and Hematologist.    - Dr. MATT You ([facility])  - [phone] Pt seen and examined at bedside. Please refer to the H&P done today for details.    Case signed out to me by the hospitalist.  Pt seen and examined at bedside. Feels well.   No chest pain, no shortness of breath.   No overnight event.   No N/V/D. No abdominal pain.   Work as an . Have sedentary lifestyle.  Eager to go home to go back to work. Her boss is calling her.  Her boss doesn't know she is in the hospital.  Told the pt, we will write work excuse letters if needed.    Pending insurance check to see if Eliquis is covered.  otherwise, she will need Lovenox bridge + coumadin.  No apparent RV strain on CTA chest.    Card and pulm consulted.  LE dopplers and official TTE pending.    Pt promised she will make an appointment with her PCP Dr. Severo Kelley and Hematologist.    Dr. Gregorio will be covering me starting 11/15/17. Please paged her at  if needed.     - Dr. MATT You ([facility])  - [phone]

## 2017-11-14 NOTE — CONSULT NOTE ADULT - SUBJECTIVE AND OBJECTIVE BOX
PULMONARY CONSULT  Davy Mae MD  [phone]    Initial HPI on admission:  HPI:  62F w/ hx of cardiomyopathy, CAD no stents, TIA p/w chest pain and SOB. Pt states starting roughly 3 days ago she experienced worsening weakness and SOB on exertion. Pt works as an  and has not been moving around as much recently given increased work load? Starting roughly 2-3 days ago she also developed a midsternal chest pain which is worsened with deep breathing. These symptoms, worsened yesterday and when they did not improve today, she came to the ER for further evaluation. Pt also endorses some recent numbness of toes and pain of R thigh. She denies any recent travel, surgery, prolonged immobilization, family history of blood clots or cancer. Currently pt states her SOB and chest pain have improved significantly. Has not had routine cancer screening.  Denies history of VTE or injury  Has c/o RLE pain and swelling  CTA with segmental RLL PE and subsegmental LLL PE; associated R basilar atelectasis: no overt infarct    In ER: Given heparin gtt (14 Nov 2017 02:46)  BRIEF HOSPITAL COURSE: ***    PAST MEDICAL & SURGICAL HISTORY:  TIA (transient ischemic attack)  Myocardial infarction  Cardiomyopathy  No significant past surgical history    Allergies    No Known Allergies     Review of Systems:  · Negative General Symptoms	no fever; no chills; no sweating	  · Negative Skin Symptoms	no rash; no itching	  · Negative Ophthalmologic Symptoms	no photophobia	  · Negative ENMT Symptoms	no hearing difficulty	  · Negative Respiratory and Thorax Symptoms	no wheezing; no cough	  · Respiratory and Thorax Symptoms	dyspnea  pleuritic chest pain	  · Negative Cardiovascular Symptoms	no palpitations; no orthopnea	  · Cardiovascular Symptoms	chest pain; dyspnea on exertion; peripheral edema	  · Negative Gastrointestinal Symptoms	no nausea; no vomiting; no abdominal pain	  · Negative General Genitourinary Symptoms	no hematuria	  · Neurological Symptoms	weakness	  · Psychiatric Symptoms	anxiety	      Allergies and Intolerances:        Allergies:  	No Known Allergies:     FAMILY HISTORY:  Family history of diabetes mellitus (Mother)  Family history of coronary artery disease (Father)    Social history:       Medications:  MEDICATIONS  (STANDING):  heparin  Infusion.  Unit(s)/Hr (16 mL/Hr) IV Continuous <Continuous>    MEDICATIONS  (PRN):  heparin  Injectable 7000 Unit(s) IV Push every 6 hours PRN For aPTT less than 40  heparin  Injectable 3500 Unit(s) IV Push every 6 hours PRN For aPTT between 40 - 57    Vital Signs Last 24 Hrs  T(C): 36.8 (14 Nov 2017 11:13), Max: 37 (14 Nov 2017 02:27)  T(F): 98.2 (14 Nov 2017 11:13), Max: 98.6 (14 Nov 2017 02:27)  HR: 72 (14 Nov 2017 11:13) (60 - 79)  BP: 110/68 (14 Nov 2017 11:13) (110/68 - 165/97)  BP(mean): --  RR: 18 (14 Nov 2017 11:13) (16 - 18)  SpO2: 96% (14 Nov 2017 11:13) (96% - 100%)      LABS:                        11.9   5.3   )-----------( 237      ( 14 Nov 2017 08:14 )             35.7     11-14    140  |  103  |  8   ----------------------------<  99  4.4   |  25  |  0.74    Ca    9.1      14 Nov 2017 07:25  Mg     2.2     11-14    TPro  7.7  /  Alb  4.6  /  TBili  0.3  /  DBili  x   /  AST  21  /  ALT  17  /  AlkPhos  86  11-13      PT/INR - ( 13 Nov 2017 20:35 )   PT: 11.4 sec;   INR: 1.05 ratio         PTT - ( 14 Nov 2017 08:14 )  PTT:> 200 sec      Serum Pro-Brain Natriuretic Peptide: 238 pg/mL (11-13-17 @ 20:35)      CULTURES:    Physical Examination:    General: No acute distress.      HEENT: Pupils equal, reactive to light.  Symmetric.    PULM: Clear to auscultation bilaterally, no significant sputum production    CVS: Regular rate and rhythm, no murmurs, rubs, or gallops    ABD: Soft, nondistended, nontender, normoactive bowel sounds, no masses    EXT: No edema, nontender    SKIN: Incr girth RLE with trace edema    NEURO: Alert, oriented, interactive, nonfocal    RADIOLOGY REVIEWED PERSONALLY  CXR:    CT chest:    TTE:

## 2017-11-14 NOTE — CONSULT NOTE ADULT - PROBLEM SELECTOR RECOMMENDATION 9
-  - heparin gtt for now. Consider eventually swithing to Xarelto or Eliquis.  - echo to evaluate LV and RV size and systolic function.  - follow-up official LE venous Duplex.

## 2017-11-14 NOTE — H&P ADULT - ASSESSMENT
62F w/ hx of cardiomyopathy, CAD no stents, TIA p/w chest pain and SOB likely due to bilateral PE and possible pulmonary infarction

## 2017-11-14 NOTE — DISCHARGE NOTE ADULT - HOSPITAL COURSE
62 F h/o nonischemic cardiomyopathy with EF 45% (cath 2008 - normal coronaries), TIA, prediabetes, obesity, dyslipidemia a/w IGLESIAS and pleuritic CP and found to have PE started on Heparin drip now transition to Xarelto.

## 2017-11-14 NOTE — CONSULT NOTE ADULT - PROBLEM SELECTOR RECOMMENDATION 2
-  - Outpatient echo report from 4/2016 reviewed as noted above. EF 45% at that time.  - repeat echo.  - will follow.    Glen Richardson M.D., [facility]  [phone]    A total of 80 minutes of face-to-face time was spent with the patient during this encounter -over half of that time was spent in counseling and coordination of care.

## 2017-11-15 VITALS
RESPIRATION RATE: 18 BRPM | HEART RATE: 63 BPM | OXYGEN SATURATION: 96 % | SYSTOLIC BLOOD PRESSURE: 110 MMHG | DIASTOLIC BLOOD PRESSURE: 65 MMHG | TEMPERATURE: 98 F

## 2017-11-15 LAB
APTT BLD: 141.9 SEC — CRITICAL HIGH (ref 27.5–37.4)
HCT VFR BLD CALC: 35.2 % — SIGNIFICANT CHANGE UP (ref 34.5–45)
HCT VFR BLD CALC: 38.1 % — SIGNIFICANT CHANGE UP (ref 34.5–45)
HGB BLD-MCNC: 11.8 G/DL — SIGNIFICANT CHANGE UP (ref 11.5–15.5)
HGB BLD-MCNC: 12.3 G/DL — SIGNIFICANT CHANGE UP (ref 11.5–15.5)
MCHC RBC-ENTMCNC: 28.7 PG — SIGNIFICANT CHANGE UP (ref 27–34)
MCHC RBC-ENTMCNC: 30.9 PG — SIGNIFICANT CHANGE UP (ref 27–34)
MCHC RBC-ENTMCNC: 32.3 GM/DL — SIGNIFICANT CHANGE UP (ref 32–36)
MCHC RBC-ENTMCNC: 33.5 GM/DL — SIGNIFICANT CHANGE UP (ref 32–36)
MCV RBC AUTO: 89 FL — SIGNIFICANT CHANGE UP (ref 80–100)
MCV RBC AUTO: 92.1 FL — SIGNIFICANT CHANGE UP (ref 80–100)
PLATELET # BLD AUTO: 238 K/UL — SIGNIFICANT CHANGE UP (ref 150–400)
PLATELET # BLD AUTO: 283 K/UL — SIGNIFICANT CHANGE UP (ref 150–400)
RBC # BLD: 3.83 M/UL — SIGNIFICANT CHANGE UP (ref 3.8–5.2)
RBC # BLD: 4.28 M/UL — SIGNIFICANT CHANGE UP (ref 3.8–5.2)
RBC # FLD: 11.8 % — SIGNIFICANT CHANGE UP (ref 10.3–14.5)
RBC # FLD: 13.2 % — SIGNIFICANT CHANGE UP (ref 10.3–14.5)
WBC # BLD: 4.56 K/UL — SIGNIFICANT CHANGE UP (ref 3.8–10.5)
WBC # BLD: 5.2 K/UL — SIGNIFICANT CHANGE UP (ref 3.8–10.5)
WBC # FLD AUTO: 4.56 K/UL — SIGNIFICANT CHANGE UP (ref 3.8–10.5)
WBC # FLD AUTO: 5.2 K/UL — SIGNIFICANT CHANGE UP (ref 3.8–10.5)

## 2017-11-15 PROCEDURE — 80048 BASIC METABOLIC PNL TOTAL CA: CPT

## 2017-11-15 PROCEDURE — 85610 PROTHROMBIN TIME: CPT

## 2017-11-15 PROCEDURE — 83735 ASSAY OF MAGNESIUM: CPT

## 2017-11-15 PROCEDURE — 83880 ASSAY OF NATRIURETIC PEPTIDE: CPT

## 2017-11-15 PROCEDURE — 85379 FIBRIN DEGRADATION QUANT: CPT

## 2017-11-15 PROCEDURE — 71275 CT ANGIOGRAPHY CHEST: CPT

## 2017-11-15 PROCEDURE — 93970 EXTREMITY STUDY: CPT

## 2017-11-15 PROCEDURE — 70450 CT HEAD/BRAIN W/O DYE: CPT

## 2017-11-15 PROCEDURE — 93306 TTE W/DOPPLER COMPLETE: CPT | Mod: 26

## 2017-11-15 PROCEDURE — 71046 X-RAY EXAM CHEST 2 VIEWS: CPT

## 2017-11-15 PROCEDURE — 93306 TTE W/DOPPLER COMPLETE: CPT

## 2017-11-15 PROCEDURE — 80053 COMPREHEN METABOLIC PANEL: CPT

## 2017-11-15 PROCEDURE — 99285 EMERGENCY DEPT VISIT HI MDM: CPT | Mod: 25

## 2017-11-15 PROCEDURE — 93005 ELECTROCARDIOGRAM TRACING: CPT

## 2017-11-15 PROCEDURE — 85027 COMPLETE CBC AUTOMATED: CPT

## 2017-11-15 PROCEDURE — 85730 THROMBOPLASTIN TIME PARTIAL: CPT

## 2017-11-15 PROCEDURE — 93308 TTE F-UP OR LMTD: CPT

## 2017-11-15 PROCEDURE — 82550 ASSAY OF CK (CPK): CPT

## 2017-11-15 PROCEDURE — 84484 ASSAY OF TROPONIN QUANT: CPT

## 2017-11-15 PROCEDURE — 93971 EXTREMITY STUDY: CPT

## 2017-11-15 PROCEDURE — 82553 CREATINE MB FRACTION: CPT

## 2017-11-15 RX ORDER — RIVAROXABAN 15 MG-20MG
20 KIT ORAL EVERY 24 HOURS
Qty: 0 | Refills: 0 | Status: CANCELLED | OUTPATIENT
Start: 2017-12-06 | End: 2017-11-15

## 2017-11-15 RX ORDER — RIVAROXABAN 15 MG-20MG
1 KIT ORAL
Qty: 41 | Refills: 0 | OUTPATIENT
Start: 2017-11-15 | End: 2017-12-06

## 2017-11-15 RX ORDER — RIVAROXABAN 15 MG-20MG
15 KIT ORAL
Qty: 0 | Refills: 0 | Status: DISCONTINUED | OUTPATIENT
Start: 2017-11-15 | End: 2017-11-15

## 2017-11-15 RX ADMIN — HEPARIN SODIUM 0 UNIT(S)/HR: 5000 INJECTION INTRAVENOUS; SUBCUTANEOUS at 01:47

## 2017-11-15 RX ADMIN — HEPARIN SODIUM 700 UNIT(S)/HR: 5000 INJECTION INTRAVENOUS; SUBCUTANEOUS at 02:47

## 2017-11-15 RX ADMIN — RIVAROXABAN 15 MILLIGRAM(S): KIT at 10:29

## 2017-11-15 RX ADMIN — RIVAROXABAN 15 MILLIGRAM(S): KIT at 17:38

## 2017-11-15 NOTE — PROGRESS NOTE ADULT - PROBLEM SELECTOR PLAN 2
-  - spoke with echo lab. TTE to be done today.  - pt euvolemic on exam.  - will follow.    Glen Richardson M.D., [facility]  [phone]

## 2017-11-15 NOTE — PROGRESS NOTE ADULT - ASSESSMENT
62 F h/o nonischemic cardiomyopathy with EF 45% (cath 2008 - normal coronaries), TIA, prediabetes, obesity, dyslipidemia a/w IGLESIAS and pleuritic CP and found to have PE's
Multilobar PE , hemodynamically stable, unprovoked  Dopplers negative for LE DVT  Non ischemic cardiomyopathy    REC:    Indefinite AC  Outpt malignancy screening  Cardio f/u
Multilobar PE , hemodynamically stable, unprovoked  Dopplers negative for LE DVT  Non ischemic cardiomyopathy    REC:    Indefinite AC  Outpt malignancy screening  Cardio f/u

## 2017-11-15 NOTE — PROGRESS NOTE ADULT - PROBLEM SELECTOR PLAN 1
-  - pt remains hemodynamically stable.  - cont heparin gtt.  - switch to xarelto or eliquis prior to discharge.

## 2017-11-15 NOTE — PROVIDER CONTACT NOTE (CRITICAL VALUE NOTIFICATION) - ACTION/TREATMENT ORDERED:
According to shama Brownlee area RN Heparin number was already address in ED prior to arriving to floor.  Heparin was stopped for 1 hour and reduced from 16 to 13 cc/hr.
NP aware.  Heparin stopped 1hr and will restart at 1930.  Pt reweighed and new weight of 84.5 KG put in.  New heparin ordered to start at 10 units per hour.
RN in ED holding followed heparin nomogram and stopped heparin for 1 hour.  When pt arrived to floor, Heparin scanned and restarted at 13 units/hr.  PTT lab f/u at 1700.
no further actions

## 2017-11-15 NOTE — PROGRESS NOTE ADULT - SUBJECTIVE AND OBJECTIVE BOX
Follow-up Pulm Progress Note  Davy Mae MD  [phone]    No new respiratory events overnight.  Denies SOB/CP.   Upper/Lower ext dopplers neg DVT    Medications:  Vital Signs Last 24 Hrs  T(C): 36.6 (15 Nov 2017 05:00), Max: 36.8 (14 Nov 2017 17:06)  T(F): 97.8 (15 Nov 2017 05:00), Max: 98.2 (14 Nov 2017 17:06)  HR: 62 (15 Nov 2017 05:00) (62 - 70)  BP: 146/92 (15 Nov 2017 05:00) (134/77 - 146/92)  BP(mean): --  RR: 18 (15 Nov 2017 05:00) (16 - 18)  SpO2: 98% (15 Nov 2017 05:00) (98% - 100%)      11-14 @ 07:01  -  11-15 @ 07:00  --------------------------------------------------------  IN: 422 mL / OUT: 0 mL / NET: 422 mL        LABS:                        12.3   4.56  )-----------( 283      ( 15 Nov 2017 09:27 )             38.1     11-14    140  |  103  |  8   ----------------------------<  99  4.4   |  25  |  0.74    Ca    9.1      14 Nov 2017 07:25  Mg     2.2     11-14    TPro  7.7  /  Alb  4.6  /  TBili  0.3  /  DBili  x   /  AST  21  /  ALT  17  /  AlkPhos  86  11-13      PT/INR - ( 13 Nov 2017 20:35 )   PT: 11.4 sec;   INR: 1.05 ratio         PTT - ( 15 Nov 2017 01:13 )  PTT:141.9 sec    Serum Pro-Brain Natriuretic Peptide: 238 pg/mL (11-13-17 @ 20:35)      CULTURES:        Physical Examination:  PULM:   CVS: Regular rate and rhythm, no murmurs, rubs, or gallops  ABD: Soft, non-tender  EXT:  No clubbing, cyanosis, or edema    RADIOLOGY REVIEWED  CXR:    CT chest:    TTE: Follow-up Pulm Progress Note  Davy Mae MD  [phone]    No new respiratory events overnight.  Denies SOB/CP.   Upper/Lower ext dopplers neg DVT    Medications:  Vital Signs Last 24 Hrs  T(C): 36.6 (15 Nov 2017 05:00), Max: 36.8 (14 Nov 2017 17:06)  T(F): 97.8 (15 Nov 2017 05:00), Max: 98.2 (14 Nov 2017 17:06)  HR: 62 (15 Nov 2017 05:00) (62 - 70)  BP: 146/92 (15 Nov 2017 05:00) (134/77 - 146/92)  BP(mean): --  RR: 18 (15 Nov 2017 05:00) (16 - 18)  SpO2: 98% (15 Nov 2017 05:00) (98% - 100%)      11-14 @ 07:01  -  11-15 @ 07:00  --------------------------------------------------------  IN: 422 mL / OUT: 0 mL / NET: 422 mL        LABS:                        12.3   4.56  )-----------( 283      ( 15 Nov 2017 09:27 )             38.1     11-14    140  |  103  |  8   ----------------------------<  99  4.4   |  25  |  0.74    Ca    9.1      14 Nov 2017 07:25  Mg     2.2     11-14    TPro  7.7  /  Alb  4.6  /  TBili  0.3  /  DBili  x   /  AST  21  /  ALT  17  /  AlkPhos  86  11-13      PT/INR - ( 13 Nov 2017 20:35 )   PT: 11.4 sec;   INR: 1.05 ratio         PTT - ( 15 Nov 2017 01:13 )  PTT:141.9 sec    Serum Pro-Brain Natriuretic Peptide: 238 pg/mL (11-13-17 @ 20:35)      CULTURES:        Physical Examination:  PULM: Without wheeze or rhonchi  CVS: Regular rate and rhythm, no murmurs, rubs, or gallops  ABD: Soft, non-tender  EXT:  No clubbing, cyanosis, or edema    RADIOLOGY REVIEWED  CXR:    CT chest:    TTE:

## 2017-11-15 NOTE — PROGRESS NOTE ADULT - SUBJECTIVE AND OBJECTIVE BOX
[facility] Cardiology Progress Note  _______________________________    Pt. seen and examined. No new cardiac-related complaints. CP has improved.    T(C): 36.6 (11-15-17 @ 05:00), Max: 36.8 (11-14-17 @ 11:13)  HR: 62 (11-15-17 @ 05:00) (62 - 72)  BP: 146/92 (11-15-17 @ 05:00) (110/68 - 146/92)  RR: 18 (11-15-17 @ 05:00) (16 - 18)  SpO2: 98% (11-15-17 @ 05:00) (96% - 100%)  I&O's Summary    14 Nov 2017 07:01  -  15 Nov 2017 07:00  --------------------------------------------------------  IN: 422 mL / OUT: 0 mL / NET: 422 mL        PHYSICAL EXAM:  GENERAL: Alert, NAD  NECK: Supple, No JVD, No carotid bruit.  CHEST/LUNG: Clear to auscultation bilaterally; No wheezes, rales, or rhonchi  HEART: S1 S2 normal, RRR,  No murmurs, rubs, or gallops  ABDOMEN: Soft, Nontender, Nondistended; Bowel sounds present  EXTREMITIES:  No LE edema.      LABS:                        11.8   5.2   )-----------( 238      ( 15 Nov 2017 01:13 )             35.2     11-14    140  |  103  |  8   ----------------------------<  99  4.4   |  25  |  0.74    Ca    9.1      14 Nov 2017 07:25  Mg     2.2     11-14    TPro  7.7  /  Alb  4.6  /  TBili  0.3  /  DBili  x   /  AST  21  /  ALT  17  /  AlkPhos  86  11-13    PT/INR - ( 13 Nov 2017 20:35 )   PT: 11.4 sec;   INR: 1.05 ratio         PTT - ( 15 Nov 2017 01:13 )  PTT:141.9 sec  CARDIAC MARKERS ( 13 Nov 2017 23:47 )  x     / <0.01 ng/mL / x     / x     / x      CARDIAC MARKERS ( 13 Nov 2017 20:35 )  x     / <0.01 ng/mL / 160 U/L / x     / 1.2 ng/mL          MEDICATIONS  (STANDING):  heparin  Infusion. 1000 Unit(s)/Hr (10 mL/Hr) IV Continuous <Continuous>    MEDICATIONS  (PRN):  heparin  Injectable 6500 Unit(s) IV Push every 6 hours PRN For aPTT less than 40  heparin  Injectable 3000 Unit(s) IV Push every 6 hours PRN For aPTT between 40 - 57  heparin  Injectable 7000 Unit(s) IV Push every 6 hours PRN For aPTT less than 40  heparin  Injectable 3500 Unit(s) IV Push every 6 hours PRN For aPTT between 40 - 57      RADIOLOGY & ADDITIONAL TESTS:

## 2017-11-15 NOTE — PROGRESS NOTE ADULT - SUBJECTIVE AND OBJECTIVE BOX
Follow-up Pulm Progress Note  Davy Mae MD  [phone]    No new respiratory events overnight.  Denies SOB/CP.   Upper/Lower ext dopplers neg DVT  TTE pending  Denies CP    Vital Signs Last 24 Hrs  T(C): 36.6 (15 Nov 2017 05:00), Max: 36.8 (14 Nov 2017 17:06)  T(F): 97.8 (15 Nov 2017 05:00), Max: 98.2 (14 Nov 2017 17:06)  HR: 62 (15 Nov 2017 05:00) (62 - 70)  BP: 146/92 (15 Nov 2017 05:00) (134/77 - 146/92)  BP(mean): --  RR: 18 (15 Nov 2017 05:00) (16 - 18)  SpO2: 98% (15 Nov 2017 05:00) (98% - 100%)                       12.3   4.56  )-----------( 283      ( 15 Nov 2017 09:27 )             38.1       11-14    140  |  103  |  8   ----------------------------<  99  4.4   |  25  |  0.74    Ca    9.1      14 Nov 2017 07:25  Mg     2.2     11-14    TPro  7.7  /  Alb  4.6  /  TBili  0.3  /  DBili  x   /  AST  21  /  ALT  17  /  AlkPhos  86  11-13    Physical Examination:  PULM: Without wheeze or rhonchi; few R basilar crackles  CVS: Regular rate and rhythm, no murmurs, rubs, or gallops  ABD: Soft, non-tender  EXT:  No clubbing, cyanosis, or edema    RADIOLOGY REVIEWED  CXR:    CT chest:    TTE:

## 2017-11-15 NOTE — CHART NOTE - NSCHARTNOTEFT_GEN_A_CORE
62 F h/o nonischemic cardiomyopathy with EF 45% (cath 2008 - normal coronaries), TIA, prediabetes, obesity, dyslipidemia a/w IGLESIAS and pleuritic CP and found to have PE started on Heparin drip now transitioned to Xarelto.         Nonischemic dilated cardiomyopathy--- patient had ECHO done today but refused to remain in the hospital reading. Patient reported that she has a follow-up appointment tomorrow 11/15/17 with Dr. Kelley-Cardiologist who will review the ECHO results with her. The above discussed with Dr. De Anda who has medically  cleared patient for discharge home with f/u in the AM with Dr. Kelley.

## 2017-12-06 RX ORDER — RIVAROXABAN 15 MG-20MG
1 KIT ORAL
Qty: 30 | Refills: 0 | OUTPATIENT
Start: 2017-12-06 | End: 2018-01-05

## 2018-12-05 NOTE — ED ADULT NURSE NOTE - MUSCULOSKELETAL ASSESSMENT
Progress Notes by Kelsi Fitzpatrick MD at 03/20/18 03:56 PM     Author:  Kelsi Fitzpatrick MD Service:  (none) Author Type:  Physician     Filed:  03/21/18 10:39 AM Encounter Date:  3/20/2018 Status:  Signed     :  Kelsi Fitzpatrick MD (Physician)            CC: Follow-up Visit    Established patient    Sotero Peng is a 78year old male who presents today for[TK1.1T]  History of hypertension, hyperlipidemia, diabetes diet-controlled[TK1.1M], AS .[TK1.2M]   Seen in the hospital earlier this year for workup for chest pain, workup negative.[TK1.1M] Stress test neg . bs - not done[TK1.2M] at home[TK1.3M]. [TK1.2M]   Reviewed recent labs glycol 6. 0[TK1.1M] ,[TK1.3M]glucose 127[TK1.1M] . [TK1.2M] cholesterol 166[TK1.1M]  bp  Ok,  No[TK1.2M] bp[TK1.3M]  Log brought , no  Cp    Prostate cancer ,  Enlarged  ,  Seen  Urologist[TK1.2M] -[TK1.3M] Discussed options of treatment, patient has decided on no treatment[TK1.3C] ,  Complains of  OAB , nocturia x 3 . Flow good , no straining .[TK1.2M]    Nephew also present to assist with history taking[TK1.3C]    Allergies:[TK1.1T] Penicillins; Sulfa antibiotics; and Aspirin    Current Outpatient Prescriptions     Medication  Sig   â¢ nitroGLYCERIN (NITROSTAT) 0.4 MG SL tablet Take 1 Tab by mouth as needed. â¢ amlodipine (NORVASC) 10 MG tablet Take 1 Tab by mouth daily. â¢ clopidogrel (PLAVIX) 75 MG tablet TAKE 1 TABLET BY MOUTH EVERY DAY   â¢ Isosorbide Mononitrate CR (IMDUR) 30 MG 24 hr tablet TAKE 1 TABLET BY MOUTH TWICE A DAY   â¢ metoprolol (LOPRESSOR) 25 MG tablet Take 1 Tab by mouth 2 (two) times daily. â¢ lisinopril (PRINIVIL,ZESTRIL) 10 MG tablet Take 2 tablets daily[TK1.4T]      Medication adherence:  Patient reports adherence to all medication dosing schedules[TK1.1T] Yes[TK1.2M]  Side effects from any medication:[TK1.1T] no[TK1.2M]    Depression Screening:  Over the past 2 weeks, has patient felt down, depressed or hopeless? [TK1.1T] No[TK1.2M]  Over the past 2 weeks, has patient felt little interest or pleasure in doing things? [TK1.1T] No[TK1.2M]    On the basis of the above screen, the following is initiated:[TK1.1T]  Normal screen, continue to monitor for symptoms over time[TK1.2M]     Does patient exercise? [TK1.1T] No.[TK1.2M]    Was counseling given:[TK1.1T] Yes[TK1.2M]    Past Medical History:     Diagnosis  Date   â¢ Anxiety    â¢ Diabetes mellitus    â¢ Diverticulosis    â¢ Hypertension    â¢ Prostate cancer    â¢ Rheumatic fever      Past Surgical History:      Procedure  Laterality Date   â¢ ROTATOR CUFF REPAIR Right      Social History      Substance Use Topics      â¢ Smoking status:  Former Smoker     Packs/day: 1.00     Years: 15.00     Types: Cigarettes     Quit date: 8/17/1962   â¢ Smokeless tobacco:  Never Used   â¢ Alcohol use  No      Family History       Problem   Relation Age of Onset   â¢ Diabetes  Mother    â¢ Heart  Father    â¢ OTHER  Sister      rheumatic fever     â¢ Vascular  Brother      stroke      â¢ Heart Attack  Brother      cabg      â¢ Stroke  Maternal Grandmother    â¢ Stroke  Son    â¢ Diabetes  Son[TK1.4T]         Relevant labs:  Lab Results      Component  Value Date    HGB 13.1 12/13/2013    HCT 38.7 12/13/2013    MCV 78.0 12/13/2013    WBC 8.0 12/13/2013    ANC 5.6 12/13/2013    ALC 1.5 12/13/2013     12/13/2013       Lab Results      Component  Value Date    CHOL 166 03/17/2018    HDL 62 03/17/2018    LDL 80 03/17/2018    TRIG 122 03/17/2018      Lab Results      Component  Value Date    GLUCOSE 127 03/17/2018    BUN 21 03/17/2018    CREAT 1.1 03/17/2018    CA 9.2 03/17/2018    BILI 0.8 03/17/2018    ALT 8 03/17/2018    AST 13 03/17/2018    ALKPHOS 70 03/17/2018    PROT 7.2 03/17/2018    ALB 3.8 03/17/2018     03/17/2018    K 4.2 03/17/2018     03/17/2018    CO2 24 03/17/2018     Lab Results      Component  Value Date    A1C 6.0 03/17/2018        Review of Systems:[TK1.1T]  General:[TK1.1M] denies fever, night sweats, "weight changes, appetite changes and sleep problems and WEIGHT GAIN[TK1.3M]  Heent:[TK1.1M] Pt denies problems with head, eyes, ears, nose, mouth, throat and neck  EAR PROBLEMS:  BOTH EARS - hearing (sounds are muffled) - present for years[TK1.3M]  Respiratory:[TK1.1M] No coughing, wheezing, changes in voice,  nor shortness of breath[TK1.3M]  Cardiovascular:[TK1.1M]No chest pain, palpitations or other cardiac complaints noted[TK1.3M]  Gastrointestinal:[TK1.1M] No diarrhea, constipation, abdominal pain or other complaints noted[TK1.3M]  Genitourinary:[TK1.1M] NOCTURIA, FREQUENCY ,[TK1.3M]  History of prostate cancer, no blood in urine[TK1.3C]  Musculoskeletal:[TK1.1M] . ..arthritis -[TK1.3M]Generalized degenerative arthritis, history of shoulder arthritis[TK1.3C]  Neurologic:[TK1.1M]Pt. denies syncope, seizures, paralysis, involuntary movements or gait problems[TK1.3M]  Psychiatric:[TK1.1M] Pt denies sleep, anxiety, depression, sexual and other psychiatric problems[TK1.3M]  Hematologic/Lymphatic/Immunologic:[TK1.1M] Pt. denies hematological, lymphatic and immunological problems[TK1.3M]  Endocrine:[TK1.1M] DIABETES[TK1.3M]  Skin:[TK1.1M] No problems with hair or nails. No rash. No new skin lesions.[TK1.3M]  All other[TK1.1M] systems reviewed are negative    OBJECTIVE  Vitals:[TK1.1T] /76  Pulse 76  Temp 97.6 Â°F (36.4 Â°C) (Temporal)  Ht 5' 8.5"" (1.74 m)  Wt 136 lb (61.7 kg)  BMI 20.38 kg/m2[TK1.4T]  Schuyler's BMI which is[TK1.1T] within normal parameters. (Ages 22-63 >/= 18.5 and <25; Over age 72 >/= 21 and <30)[TK1.2M]    Physical Exam:[TK1.1T]   General appearance -[TK1.1M] alert & oriented, pleasant and comfortable, no distress, cooperative,[TK1.3M] Older, thin[TK1.3C]  Skin -[TK1.1M] No new lesions or rashes, decreased skin turgor, atrophic changes due to age. [TK1.3C]  Head -[IA5.1T] Normocephalic. No masses, lesions, tenderness or abnormalities[TK1.3M]  Eyes -[TK1.1M] conjunctivae/corneas clear.  PERRL, EOM's " intact. [TK1.3M]  Ears -[TK1.1M] negative,[TK1.3M] Hearing loss[TK1.3C]  Oropharynx -[TK1.1M] Lips, mucosa, tongue normal. Oropharynx pink and moist.,[TK1.3M] Dentition poor[TK1.3C]  Neck -[TK1.1M]  No adenopathy. Thyroid symmetric, normal size  --- POSITIVE FINDINGS: limited flexion/extension[TK1.3M]  Lungs -[TK1.1M] CTA throughout without crackles, rhonchi, or wheezes. [TK1.3M]    Heart -[TK1.1M] RRR w/o S3, S4, faint  Murmur, no JVD, No carotid bruits[TK1.3M]   Extremities -[TK1.1M] No cyanosis, clubbing and No edema[TK1.3M]  Musculoskeletal -[TK1.1M] slowed, abnormal gait and balance fair[TK1.5M]  Diffuse degenerative arthritic changes,Bulky changes noted to the left knee with some crepitation. Diffuse muscle atrophy[TK1.5C]  Peripheral pulses -[TK1.1M] All pulses intact - radial, dorsalis pedis and posterior tibialis[TK1.5M]  Neuro -[TK1.1M] CN II-XII GI, sensory and motor innervation intact,[TK1.5M] Speech clear, memory fair[TK1.5C]    ASSESSMENT[TK1.1M]/PLAN[TK1.1T]  1. Prostate cancer[TK1.6T]  Seen by urologist, no current treatment plan. [TK1.5C]    2. OAB (overactive bladder)[TK1.6T]  History of prostate cancer as well as BPH, will begin trial medication of both Flomax as well as Proscar, hopefully the Proscar may shrink some of prost[TK1.5C]ate[TK1.5M] tissue to help with voiding symptoms at night[TK1.5C] as well[TK1.5M]  Check repeat PSA in several months, expect a drop in PSA[TK1.5C]  - Tamsulosin HCl (FLOMAX) 0.4 MG CAPS; Take 1 Cap by mouth daily. Dispense: 30 Cap; Refill: 2  - finasteride (PROSCAR) 5 MG tablet; Take 1 Tab by mouth daily. Dispense: 30 Tab; Refill: 2    3. Essential hypertension[TK1.6T]  Blood pressure stable needs a check home blood pressures more frequent[TK1.5C]ly[TK1.5M]    4. Type 2 diabetes mellitus without complication, without long-term current use of insulin[TK1.6T]  Controlled good, continue with dietary effort, needs a check home blood sugars[TK1.5C]    5.  Arthralgia of left knee[TK1.6T]  Exam shows bilateral knee arthritis, left greater than right, recommend over-the-counter nonsteroidals as needed. Not a surgical candidate[TK1.5C]        Medication changes:[TK1.1T] Yes. Patient was advised on side effects and interactions of the new medication. Also understands risks of not taking medications or adhering to medication schedule.[TK1.2M]    Follow-up in[TK1.1T] 5  months[TK1.2M]. Greater than 50% of the[TK1.1T] 40[TK1.2M] minute appointment was spent counseling patient regarding disease management, and treatment plan. [TK1.1T]    Electronically Signed by:    North De La Torre MD , 3/20/2018[TK1.4T]         Revision History        User Key Date/Time User Provider Type Action    > TK1.5 03/21/18 10:39 AM North De La Torre MD Physician Sign     TK1.3 03/21/18 10:01 AM North De La Torre MD Physician      TK1.6 03/20/18 04:33 PM North De La Torre MD Physician      TK1.4 03/20/18 04:23 PM North De La Torre MD Physician      TK1.2 03/20/18 04:16 PM North De La Torre MD Physician      TK1.1 03/20/18 03:56 PM North De La Torre MD Physician     C - Copied, M - Manual, T - Template WDL

## 2019-07-07 ENCOUNTER — EMERGENCY (EMERGENCY)
Facility: HOSPITAL | Age: 64
LOS: 1 days | Discharge: HOME | End: 2019-07-07
Attending: EMERGENCY MEDICINE
Payer: COMMERCIAL

## 2019-07-07 VITALS
DIASTOLIC BLOOD PRESSURE: 78 MMHG | SYSTOLIC BLOOD PRESSURE: 121 MMHG | TEMPERATURE: 98 F | HEART RATE: 80 BPM | OXYGEN SATURATION: 97 % | RESPIRATION RATE: 18 BRPM

## 2019-07-07 VITALS
HEIGHT: 67 IN | DIASTOLIC BLOOD PRESSURE: 68 MMHG | WEIGHT: 195.11 LBS | OXYGEN SATURATION: 95 % | RESPIRATION RATE: 18 BRPM | HEART RATE: 73 BPM | SYSTOLIC BLOOD PRESSURE: 102 MMHG | TEMPERATURE: 98 F

## 2019-07-07 LAB
ALBUMIN SERPL ELPH-MCNC: 4.2 G/DL — SIGNIFICANT CHANGE UP (ref 3.3–5)
ALP SERPL-CCNC: 80 U/L — SIGNIFICANT CHANGE UP (ref 40–120)
ALT FLD-CCNC: 15 U/L — SIGNIFICANT CHANGE UP (ref 10–45)
ANION GAP SERPL CALC-SCNC: 10 MMOL/L — SIGNIFICANT CHANGE UP (ref 5–17)
APTT BLD: 41.1 SEC — HIGH (ref 27.5–36.3)
AST SERPL-CCNC: 15 U/L — SIGNIFICANT CHANGE UP (ref 10–40)
BILIRUB SERPL-MCNC: 0.3 MG/DL — SIGNIFICANT CHANGE UP (ref 0.2–1.2)
BUN SERPL-MCNC: 11 MG/DL — SIGNIFICANT CHANGE UP (ref 7–23)
CALCIUM SERPL-MCNC: 9.4 MG/DL — SIGNIFICANT CHANGE UP (ref 8.4–10.5)
CHLORIDE SERPL-SCNC: 100 MMOL/L — SIGNIFICANT CHANGE UP (ref 96–108)
CO2 SERPL-SCNC: 27 MMOL/L — SIGNIFICANT CHANGE UP (ref 22–31)
CREAT SERPL-MCNC: 0.81 MG/DL — SIGNIFICANT CHANGE UP (ref 0.5–1.3)
GLUCOSE SERPL-MCNC: 81 MG/DL — SIGNIFICANT CHANGE UP (ref 70–99)
HCT VFR BLD CALC: 37.1 % — SIGNIFICANT CHANGE UP (ref 34.5–45)
HGB BLD-MCNC: 12.3 G/DL — SIGNIFICANT CHANGE UP (ref 11.5–15.5)
INR BLD: 1.73 RATIO — HIGH (ref 0.88–1.16)
MAGNESIUM SERPL-MCNC: 2.1 MG/DL — SIGNIFICANT CHANGE UP (ref 1.6–2.6)
MCHC RBC-ENTMCNC: 29.7 PG — SIGNIFICANT CHANGE UP (ref 27–34)
MCHC RBC-ENTMCNC: 33.3 GM/DL — SIGNIFICANT CHANGE UP (ref 32–36)
MCV RBC AUTO: 89.3 FL — SIGNIFICANT CHANGE UP (ref 80–100)
NT-PROBNP SERPL-SCNC: 576 PG/ML — HIGH (ref 0–300)
PLATELET # BLD AUTO: 280 K/UL — SIGNIFICANT CHANGE UP (ref 150–400)
POTASSIUM SERPL-MCNC: 4.4 MMOL/L — SIGNIFICANT CHANGE UP (ref 3.5–5.3)
POTASSIUM SERPL-SCNC: 4.4 MMOL/L — SIGNIFICANT CHANGE UP (ref 3.5–5.3)
PROT SERPL-MCNC: 7.4 G/DL — SIGNIFICANT CHANGE UP (ref 6–8.3)
PROTHROM AB SERPL-ACNC: 20 SEC — HIGH (ref 10–12.9)
RBC # BLD: 4.15 M/UL — SIGNIFICANT CHANGE UP (ref 3.8–5.2)
RBC # FLD: 11.6 % — SIGNIFICANT CHANGE UP (ref 10.3–14.5)
SODIUM SERPL-SCNC: 137 MMOL/L — SIGNIFICANT CHANGE UP (ref 135–145)
TROPONIN T, HIGH SENSITIVITY RESULT: 6 NG/L — SIGNIFICANT CHANGE UP (ref 0–51)
TROPONIN T, HIGH SENSITIVITY RESULT: 7 NG/L — SIGNIFICANT CHANGE UP (ref 0–51)
WBC # BLD: 4.1 K/UL — SIGNIFICANT CHANGE UP (ref 3.8–10.5)
WBC # FLD AUTO: 4.1 K/UL — SIGNIFICANT CHANGE UP (ref 3.8–10.5)

## 2019-07-07 PROCEDURE — 85610 PROTHROMBIN TIME: CPT

## 2019-07-07 PROCEDURE — 83880 ASSAY OF NATRIURETIC PEPTIDE: CPT

## 2019-07-07 PROCEDURE — 71045 X-RAY EXAM CHEST 1 VIEW: CPT | Mod: 26

## 2019-07-07 PROCEDURE — 85027 COMPLETE CBC AUTOMATED: CPT

## 2019-07-07 PROCEDURE — 93010 ELECTROCARDIOGRAM REPORT: CPT | Mod: 59

## 2019-07-07 PROCEDURE — 99285 EMERGENCY DEPT VISIT HI MDM: CPT

## 2019-07-07 PROCEDURE — 71275 CT ANGIOGRAPHY CHEST: CPT

## 2019-07-07 PROCEDURE — 84484 ASSAY OF TROPONIN QUANT: CPT

## 2019-07-07 PROCEDURE — 99284 EMERGENCY DEPT VISIT MOD MDM: CPT | Mod: 25

## 2019-07-07 PROCEDURE — 71045 X-RAY EXAM CHEST 1 VIEW: CPT

## 2019-07-07 PROCEDURE — 85730 THROMBOPLASTIN TIME PARTIAL: CPT

## 2019-07-07 PROCEDURE — 83735 ASSAY OF MAGNESIUM: CPT

## 2019-07-07 PROCEDURE — 80053 COMPREHEN METABOLIC PANEL: CPT

## 2019-07-07 PROCEDURE — 93005 ELECTROCARDIOGRAM TRACING: CPT

## 2019-07-07 PROCEDURE — 71275 CT ANGIOGRAPHY CHEST: CPT | Mod: 26

## 2019-07-07 NOTE — ED PROVIDER NOTE - PROGRESS NOTE DETAILS
Aspen Melgar MD: spoke w/ patient regarding CTA results, no evidence of blood clots, + pneumonia, pt has clear lungs to ascultation, she is not tachypneic in the ED, she is speaking in full sentances, reports that she feels well to go home w/ give augmentin (pt w/ no allergies) counselled to take probiotics and her medications that are prescribed to her. Pt does not have contact information for Dr Kelley. We are unable to locate the doctor under that name. Pt unsure if his first name is Severo. Number attached is law office. Offered admission, pt does not want to stay any longer. Will give doxycyline for pneumonia and discharge. Patient informed of ED visit findings, understands plan.  Patient provided with written and further verbal instructions not included in discharge paperwork.  Patient instructed to follow up with their primary care physician in 2-3 days and return for new, worsened, or persistent symptoms. Pt continues to be asymptomatic in ED

## 2019-07-07 NOTE — ED PROVIDER NOTE - SHIFT CHANGE DETAILS
CTa read, repeat Trop, possible admit CTa read, repeat Trop, possible admit  Pt doesn't want to be admitted at this time, she was offered admission but refused; she has evidence of multifocal pneumonia

## 2019-07-07 NOTE — ED ADULT NURSE NOTE - ED CARDIAC RHYTHM
Patient is a 74y old  Male who presents with a chief complaint of sepsis (04 Nov 2018 16:41)      SUBJECTIVE / OVERNIGHT EVENTS:  Awake  No N/V    MEDICATIONS  (STANDING):  aspirin enteric coated 81 milliGRAM(s) Oral daily  dextrose 5%. 1000 milliLiter(s) (50 mL/Hr) IV Continuous <Continuous>  dextrose 50% Injectable 12.5 Gram(s) IV Push once  dextrose 50% Injectable 25 Gram(s) IV Push once  dextrose 50% Injectable 25 Gram(s) IV Push once  docusate sodium 100 milliGRAM(s) Oral three times a day  heparin  Injectable 5000 Unit(s) SubCutaneous every 8 hours  insulin lispro (HumaLOG) corrective regimen sliding scale   SubCutaneous three times a day before meals  insulin lispro (HumaLOG) corrective regimen sliding scale   SubCutaneous at bedtime  lisinopril 5 milliGRAM(s) Oral daily  metoprolol succinate ER 25 milliGRAM(s) Oral daily  multivitamin 1 Tablet(s) Oral daily  nystatin Powder 1 Application(s) Topical two times a day  pantoprazole    Tablet 40 milliGRAM(s) Oral before breakfast  rifampin 300 milliGRAM(s) Oral three times a day  simvastatin 20 milliGRAM(s) Oral at bedtime  sodium chloride 0.9%. 1000 milliLiter(s) (75 mL/Hr) IV Continuous <Continuous>  tamsulosin 0.4 milliGRAM(s) Oral at bedtime  vancomycin  IVPB 1000 milliGRAM(s) IV Intermittent every 12 hours    MEDICATIONS  (PRN):  acetaminophen   Tablet .. 650 milliGRAM(s) Oral every 6 hours PRN Temp greater or equal to 38C (100.4F), Mild Pain (1 - 3), Moderate Pain (4 - 6)  dextrose 40% Gel 15 Gram(s) Oral once PRN Blood Glucose LESS THAN 70 milliGRAM(s)/deciliter  glucagon  Injectable 1 milliGRAM(s) IntraMuscular once PRN Glucose LESS THAN 70 milligrams/deciliter  magnesium hydroxide Suspension 30 milliLiter(s) Oral daily PRN Constipation        CAPILLARY BLOOD GLUCOSE      POCT Blood Glucose.: 229 mg/dL (04 Nov 2018 22:19)  POCT Blood Glucose.: 230 mg/dL (04 Nov 2018 17:53)  POCT Blood Glucose.: 162 mg/dL (04 Nov 2018 13:29)  POCT Blood Glucose.: 209 mg/dL (04 Nov 2018 08:56)    I&O's Summary      PHYSICAL EXAM:    NECK: Supple, No JVD  CHEST/LUNG: Clear to auscultation bilaterally; No wheezing.  HEART: Regular rate and rhythm; No murmurs, rubs, or gallops  ABDOMEN: Soft, Nontender, Nondistended; Bowel sounds present  EXTREMITIES:   No clubbing, cyanosis, or edema  NEUROLOGY: Awake    LABS:                        11.5   7.64  )-----------( 107      ( 04 Nov 2018 04:11 )             34.7     11-04    140  |  108<H>  |  15  ----------------------------<  207<H>  3.6   |  20<L>  |  0.72    Ca    8.4      04 Nov 2018 04:11    TPro  5.6<L>  /  Alb  2.9<L>  /  TBili  0.2  /  DBili  x   /  AST  10  /  ALT  11  /  AlkPhos  58  11-04            CAPILLARY BLOOD GLUCOSE      POCT Blood Glucose.: 229 mg/dL (04 Nov 2018 22:19)  POCT Blood Glucose.: 230 mg/dL (04 Nov 2018 17:53)  POCT Blood Glucose.: 162 mg/dL (04 Nov 2018 13:29)  POCT Blood Glucose.: 209 mg/dL (04 Nov 2018 08:56)    11-03 @ 06:13  Culture-urine --  Culture results --  method type --  Organism --  Organism Identification --  Specimen source BLOOD PERIPHERAL  11-02 @ 11:50  Culture-urine --  Culture results --  method type --  Organism --  Organism Identification --  Specimen source BLOOD PERIPHERAL  11-01 @ 15:22  Culture-urine   NO GROWTH AT 24 HOURS  Culture results --  method type --  Organism --  Organism Identification --  Specimen source URINE CATHETER  11-01 @ 13:38  Culture-urine --  Culture results --  method type PCR  Organism BLOOD CULTURE PCR  Organism Identification BLOOD CULTURE PCR  Staphylococcus aureus  Specimen source BLOOD PERIPHERAL           11-03 @ 06:13  Culture blood   NO ORGANISMS ISOLATED  NO ORGANISMS ISOLATED AT 24 HOURS  Culture results --  Gram stain --  Gram stain blood --  Method type --  Organism --  Organism identification --  Specimen source BLOOD PERIPHERAL   11-02 @ 11:50  Culture blood   NO ORGANISMS ISOLATED  NO ORGANISMS ISOLATED AT 48 HRS.  Culture results --  Gram stain --  Gram stain blood --  Method type --  Organism --  Organism identification --  Specimen source BLOOD PERIPHERAL   11-01 @ 15:22  Culture blood --  Culture results --  Gram stain --  Gram stain blood --  Method type --  Organism --  Organism identification --  Specimen source URINE CATHETER   11-01 @ 13:38  Culture blood   ***Blood Panel PCR results on this specimen are available  approximately 3 hours after the Gram stain result***  Gram stain, PCR, and/or culture results may not always  correspond due to difference in methodologies  ------------------------------------------------------------  This PCR assay was performed using [employer].  The  following targets are tested for:  Enterococcus, vancomycin  resistant enterococci, Listeria monocytogenes,  coagulase  negative staphylococci, S. aureus, methicillin resistant S.  aureus, Streptococcus agalactiae (Group B), S. pneumoniae,  S. pyogenes (Group A), Acinetobacter baumannii, Enterobacter  cloacae, E. coli, Klebsiella oxytoca, K. pneumoniae, Proteus  sp., Serratia marcescens, Haemophilus influenzae, Neisseria  meningitidis, Pseudomonas aeruginosa, Candida albicans, C.  glabrata, C. krusei, C. parapsilosis, C. tropicalis and the  KPC resistance gene.  **NOTE: Due to technical problems, Proteus sp. will NOT be  reported as part of the BCID paneluntil further notice.  Culture results --  Gram stain --  Gram stain blood   ***** CRITICAL RESULT *****  PERSON CALLED / READ-BACK: BLANK CRAWFORD RN  DATE / TIME CALLED: 11/02/18 0707  CALLED BY: DEANNE NOE  GPCCL^Gram Pos Cocci In Clusters  AFTER: 16 HOURS INCUBATION  BOTTLE: AEROBIC BOTTLE  Method type PCR  Organism BLOOD CULTURE PCR  Organism identification BLOOD CULTURE PCR  Staphylococcus aureus  Specimen source BLOOD PERIPHERAL      RADIOLOGY & ADDITIONAL TESTS:    Imaging Personally Reviewed:    Consultant(s) Notes Reviewed:      Care Discussed with Consultants/Other Providers: regular

## 2019-07-07 NOTE — ED PROVIDER NOTE - CLINICAL SUMMARY MEDICAL DECISION MAKING FREE TEXT BOX
63F chest pain hx PE non complaint with xarelto. Labs with trop, CTA for PE. Pt does not want to be admitted but agreed to ED evaluation. Cards Dr Severo Kelley. Will call Dr. Kelley prior to AMA. Would prefer to admit for rule out ACS due to age and cardiomyopathy hx. EKG PVCs, non ischemic 63F chest pain hx PE non complaint with xarelto. Labs with trop, CTA for PE. Pt does not want to be admitted but agreed to ED evaluation. Cards Dr Severo Kelley. Will call Dr. Kelley prior to AMA. Would prefer to admit for rule out ACS due to age and cardiomyopathy hx. EKG PVCs, non ischemic    Vandana Zamarripa MD - Attending Physician: Pt here with CP, not resolved. Cardiomyopathy, prior PE noncompliant with AC. Concern for ACS though history atypical, possible PE. Pt declining admission for ACS w/up. Agreeable to initial labs, CTa at this time. Revisit admission as needed once results return 63F chest pain hx PE non complaint with xarelto. Labs with trop, mag considering PVCs. CTA for PE. Pt does not want to be admitted but agreed to ED evaluation. Cards Dr Severo Kelley. EKG PVCs, non ischemic    Vandana Zamarripa MD - Attending Physician: Pt here with CP, not resolved. Cardiomyopathy, prior PE noncompliant with AC. Concern for ACS though history atypical, possible PE. Pt declining admission for ACS w/up. Agreeable to initial labs, CTa at this time. Revisit admission as needed once results return

## 2019-07-07 NOTE — ED PROVIDER NOTE - ATTENDING CONTRIBUTION TO CARE
Vandana Zamarripa MD - Attending Physician: I have personally seen and examined this patient with the resident/fellow.  I have fully participated in the care of this patient. I have reviewed all pertinent clinical information, including history, physical exam, plan and the Resident/Fellow’s note and agree except as noted. See MDM

## 2019-07-07 NOTE — ED PROVIDER NOTE - CARE PLAN
Principal Discharge DX:	Chest pain, unspecified type  Secondary Diagnosis:	Cardiomyopathy, unspecified type

## 2019-07-07 NOTE — ED PROVIDER NOTE - NSFOLLOWUPINSTRUCTIONS_ED_ALL_ED_FT
Please follow up with your primary physician in 24-48 hr.  Seek immediate medical care for any new/worsening signs or symptoms.  Please take 600 mg of Ibuprofen (aka Motrin, Advil) and/or 650 mg Acetaminophen (aka Tylenol) every 6 hours, as needed, for mild-moderate pain.    Please do not take these medications if you do not have pain or if you have any history of bleeding disorders, kidney or liver disease.   Do not use ibuprofen if you are on blood thinners (anti-coagulation).  Take DOXYCYLINE TWO TIMES PER DAY FOR 7 DAYS. Stay out of the sun while you take this medication as it can make a rash.

## 2019-07-07 NOTE — ED PROVIDER NOTE - CARDIAC, MLM
Normal rate, regular rhythm.  Heart sounds S1, S2.  No murmurs, rubs or gallops. No LE edema or calf pain

## 2019-07-07 NOTE — ED PROVIDER NOTE - OBJECTIVE STATEMENT
62yo female pmh PE on xarelto (not compliant), cardiomyopathy p/w chest pain x 10 minutes while in [place]. received 162mg aspirin by EMS. Pt now asymptomatic. Does not take xarelto. Denies dyspnea, hemoptysis, LE swelling, n/v/d, abd pain, urinary symptoms. 64yo female pmh PE on xarelto (not compliant), cardiomyopathy p/w chest pain x 10 minutes while in [employer]. received 162mg aspirin by EMS. Pt now asymptomatic. Does not take xarelto consistently. Denies dyspnea, hemoptysis, LE swelling, n/v/d, abd pain, urinary symptoms. States this pain does not feel like prior PE. Unsure of last stress/cath. 62yo female pmh PE on xarelto (not compliant), cardiomyopathy p/w chest pain x 10 minutes while in [place]. received 162mg aspirin by EMS. Pt now asymptomatic. Does not take xarelto consistently. Denies dyspnea, hemoptysis, LE swelling, n/v/d, abd pain, urinary symptoms. States this pain does not feel like prior PE. Unsure of last stress/cath. Cards: Dr Kelley

## 2019-07-07 NOTE — ED ADULT NURSE NOTE - OBJECTIVE STATEMENT
Patient came in with chest pain x 10 minutes while in [place]. Patient stated she did a lot of moving while at [employer]. Pt stated that she feels much better already. Pt is not diaphoretic. Pt without any chills. Pt alert and oriented X4. No distress. Breathing easy and non labored. Pt ambulatory.

## 2020-02-26 NOTE — PATIENT PROFILE ADULT. - HEALTHCARE QUESTIONS, PROFILE
History of Present Illness


General


Chief Complaint:  Flu Like Symptoms





Present Illness


HPI


Patient is a 37-year-old male presents after increased generalized body aches 

and nonproductive cough.  Gradual onset of symptoms.  Reports having 

generalized malaise as well as feeling weak all over.  Been having frequent 

episodes of coughing.  Prior history of hypertension.  He denies any other 

prior medical history.  Denies any recent travel.  He denies any vomiting or 

diarrhea.  States he does not drink alcohol regularly.


Allergies:  


Uncoded Allergies:  


     PENICILLIN (Allergy, Unknown, 12/5/19)





Patient History


Past Medical History:  see triage record


Reviewed Nursing Documentation:  PMH: Agreed; PSxH: Agreed





Nursing Documentation-PMH


Hx Hypertension:  Yes





Review of Systems


All Other Systems:  negative except mentioned in HPI





Physical Exam





Vital Signs








  Date Time  Temp Pulse Resp B/P (MAP) Pulse Ox O2 Delivery O2 Flow Rate FiO2


 


2/26/20 00:41 99.5 143 20 157/91 (113) 95 Room Air  








Sp02 EP Interpretation:  reviewed, normal


General Appearance:  normal inspection, well appearing, no apparent distress, 

alert, GCS 15, obese, Chronically Ill


Head:  atraumatic


ENT:  normal ENT inspection, hearing grossly normal, normal voice


Neck:  normal inspection, full range of motion, supple, no bony tend


Respiratory:  normal inspection, no respiratory distress, no retraction, no 

wheezing


Cardiovascular #1:  no edema, tachycardia


Gastrointestinal:  normal inspection, normal bowel sounds, non tender, soft, no 

guarding, no hernia


Genitourinary:  no CVA tenderness


Musculoskeletal:  normal inspection, back normal, normal range of motion


Neurologic:  alert, motor strength/tone normal, CNs III-XII nml as tested, 

oriented x3, responsive, speech normal, normal inspection


Psychiatric:  normal inspection, judgement/insight normal, mood/affect normal


Skin:  other - Scaly rash to extremities consistent with psoriasis





Medical Decision Making


Diagnostic Impression:  


 Primary Impression:  


 Acute febrile illness


 Additional Impressions:  


 Tachycardia


 Pneumonia


ER Course


Patient presented for cough.  Differential diagnosis includes not limited to 

pneumonia, viral respiratory infection, bronchitis among others.  Because of 

complexity of patient's case laboratory tests and imaging studies were ordered.

  Patient's laboratory testing showed somewhat elevated white blood count as 

well as normal hemoglobin.  Patient started on IV fluids.  He was given 

antipyretics.  He was given IV antibiotics after blood cultures were obtained.  

Lactic acid level was noted to be less than 2.  Patient's was noted to be 

persistently tachycardic and will be admitted for further evaluation and 

treatment of acute febrile illness.  Patient does not currently have any 

urinary symptoms and urinalysis did not show any evidence of definite 

infection.  Dr. Belle Sepulveda was contacted for inpatient management





Labs








Test


  2/26/20


01:00 2/26/20


03:00


 


White Blood Count


  14.1 K/UL


(4.8-10.8) 


 


 


Red Blood Count


  5.40 M/UL


(4.70-6.10) 


 


 


Hemoglobin


  16.5 G/DL


(14.2-18.0) 


 


 


Hematocrit


  48.5 %


(42.0-52.0) 


 


 


Mean Corpuscular Volume 90 FL (80-99)  


 


Mean Corpuscular Hemoglobin


  30.5 PG


(27.0-31.0) 


 


 


Mean Corpuscular Hemoglobin


Concent 34.0 G/DL


(32.0-36.0) 


 


 


Red Cell Distribution Width


  12.1 %


(11.6-14.8) 


 


 


Platelet Count


  193 K/UL


(150-450) 


 


 


Mean Platelet Volume


  8.0 FL


(6.5-10.1) 


 


 


Neutrophils (%) (Auto)  % (45.0-75.0)  


 


Lymphocytes (%) (Auto)  % (20.0-45.0)  


 


Monocytes (%) (Auto)  % (1.0-10.0)  


 


Eosinophils (%) (Auto)  % (0.0-3.0)  


 


Basophils (%) (Auto)  % (0.0-2.0)  


 


Sodium Level


  144 MMOL/L


(136-145) 


 


 


Potassium Level


  3.6 MMOL/L


(3.5-5.1) 


 


 


Chloride Level


  107 MMOL/L


() 


 


 


Carbon Dioxide Level


  26 MMOL/L


(21-32) 


 


 


Anion Gap


  11 mmol/L


(5-15) 


 


 


Blood Urea Nitrogen


  17 mg/dL


(7-18) 


 


 


Creatinine


  1.6 MG/DL


(0.55-1.30) 


 


 


Estimat Glomerular Filtration


Rate 48.4 mL/min


(>60) 


 


 


Glucose Level


  106 MG/DL


() 


 


 


Lactic Acid Level


  1.70 mmol/L


(0.4-2.0) 


 


 


Calcium Level


  9.0 MG/DL


(8.5-10.1) 


 


 


Total Bilirubin


  0.3 MG/DL


(0.2-1.0) 


 


 


Aspartate Amino Transf


(AST/SGOT) 18 U/L (15-37) 


  


 


 


Alanine Aminotransferase


(ALT/SGPT) 32 U/L (12-78) 


  


 


 


Alkaline Phosphatase


  115 U/L


() 


 


 


Total Protein


  6.9 G/DL


(6.4-8.2) 


 


 


Albumin


  3.4 G/DL


(3.4-5.0) 


 


 


Globulin 3.5 g/dL  


 


Albumin/Globulin Ratio 1.0 (1.0-2.7)  








EKG Diagnostic Results


Rate:  tachycardiac


Rhythm:  NSR


ST Segments:  no acute changes





Last Vital Signs








  Date Time  Temp Pulse Resp B/P (MAP) Pulse Ox O2 Delivery O2 Flow Rate FiO2


 


2/26/20 02:26 101.0       


 


2/26/20 00:50  143 20   Room Air  


 


2/26/20 00:50    157/91 95   








Status:  improved


Disposition:  ADMITTED AS INPATIENT


Condition:  Stable


Referrals:  


NOT CHOSEN IPA/MD,REFERRING (PCP)











Pratik Robins MD Feb 26, 2020 03:16
none

## 2021-07-26 NOTE — ED PROVIDER NOTE - CARE PLAN
Principal Discharge DX:	Influenza  Instructions for follow-up, activity and diet:	Please take Tamiflu for 5 days as instructed.  In addition, please take Azithromycin for 4 more days as instructed.  Please follow up with your PMD within 24-48 hrs for further evaluation.  If you experience any worsening symptoms, please report to the ED ASAP.  Secondary Diagnosis:	Pneumonia Principal Discharge DX:	Influenza  Instructions for follow-up, activity and diet:	Please take Tamiflu for 5 days as instructed.  In addition, please take Azithromycin for 4 more days as instructed.  Please use the albuterol inhaler every 4-6hrs as needed for respiratory symptoms.  Please follow up with your PMD within 24-48 hrs for further evaluation.  If you experience any worsening symptoms, please report to the ED ASAP.  Secondary Diagnosis:	Pneumonia 0 = understands/communicates without difficulty

## 2021-08-20 ENCOUNTER — EMERGENCY (EMERGENCY)
Facility: HOSPITAL | Age: 66
LOS: 1 days | Discharge: HOME | End: 2021-08-20
Attending: EMERGENCY MEDICINE | Admitting: EMERGENCY MEDICINE
Payer: MEDICARE

## 2021-08-20 VITALS
RESPIRATION RATE: 18 BRPM | HEART RATE: 70 BPM | TEMPERATURE: 98 F | DIASTOLIC BLOOD PRESSURE: 69 MMHG | WEIGHT: 197.98 LBS | HEIGHT: 67 IN | OXYGEN SATURATION: 97 % | SYSTOLIC BLOOD PRESSURE: 102 MMHG

## 2021-08-20 PROCEDURE — 99283 EMERGENCY DEPT VISIT LOW MDM: CPT | Mod: 25

## 2021-08-20 PROCEDURE — 73030 X-RAY EXAM OF SHOULDER: CPT

## 2021-08-20 PROCEDURE — 99284 EMERGENCY DEPT VISIT MOD MDM: CPT

## 2021-08-20 PROCEDURE — 73030 X-RAY EXAM OF SHOULDER: CPT | Mod: 26,RT

## 2021-08-20 RX ORDER — LIDOCAINE 4 G/100G
1 CREAM TOPICAL ONCE
Refills: 0 | Status: COMPLETED | OUTPATIENT
Start: 2021-08-20 | End: 2021-08-20

## 2021-08-20 RX ORDER — ACETAMINOPHEN 500 MG
975 TABLET ORAL ONCE
Refills: 0 | Status: COMPLETED | OUTPATIENT
Start: 2021-08-20 | End: 2021-08-20

## 2021-08-20 RX ADMIN — LIDOCAINE 1 PATCH: 4 CREAM TOPICAL at 11:45

## 2021-08-20 NOTE — ED PROVIDER NOTE - MUSCULOSKELETAL MINIMAL EXAM
**ATTENDING ADDENDUM (Dr. Kvng Wing): NO right upper extremity focal weakness. NO numbness, tingling, weakness, or paresthesias. POSITIVE pain with attempted passive and active range of motion, worse with attempted right shoulder external and internal rotation, flexion, and abduction. NO distal discoloration./RANGE OF MOTION LIMITED/neck supple/motor intact

## 2021-08-20 NOTE — ED PROVIDER NOTE - NSICDXPASTMEDICALHX_GEN_ALL_CORE_FT
PAST MEDICAL HISTORY:  Cardiomyopathy     Myocardial infarction     TIA (transient ischemic attack)

## 2021-08-20 NOTE — ED PROVIDER NOTE - NEUROLOGICAL, MLM
Alert and oriented, no focal deficits, no motor or sensory deficits. Alert and oriented, no focal deficits, no motor or sensory deficits. **ATTENDING ADDENDUM (Dr. Kvng Wing): NO focal or generalized weakness. NO numbness, tingling, weakness, or paresthesias.

## 2021-08-20 NOTE — ED PROVIDER NOTE - ATTENDING CONTRIBUTION TO CARE
**ATTENDING ADDENDUM (Dr. Kvng Wing): I attest that I have directly examined this patient and reviewed and formulated the diagnostic and therapeutic management plan in collaboration with the advanced practitioner (NP, PA). Please see MDM note and remainder of EMR for findings from CC, HPI, ROS, and PE. (Carlitos)

## 2021-08-20 NOTE — ED PROVIDER NOTE - MUSCULOSKELETAL NECK EXAM
**ATTENDING ADDENDUM (Dr. Kvng Wing): NO cervical-thoracic-lumbar-sacral midline bony tenderness or stepoff with palpation./no deformity, pain or tenderness. no restriction of movement/supple/trachea midline

## 2021-08-20 NOTE — ED PROVIDER NOTE - PLAN OF CARE
**ATTENDING ADDENDUM (Dr. Kvng Wing): Goals of care include resolution of emergent/urgent symptoms and concerns, and restoration to baseline level of homeostasis.

## 2021-08-20 NOTE — ED PROVIDER NOTE - EYES, MLM
**ATTENDING ADDENDUM (Dr. Kvng Wing): Extraocular muscle movements intact. Clear corneas bilaterally, pupils equal and round.

## 2021-08-20 NOTE — ED PROVIDER NOTE - NS ED ROS FT
**ATTENDING ADDENDUM (Dr. Kvng Wing): During my interview with the patient, I have personally obtained and/or have directly verified the elements in the past medical/surgical history and other histories as noted earlier in the EMR, in conjunction with the other members (EM resident/PA/NP) of the patient care team. I have also personally obtained and/or have directly verified/reviewed the review of systems as documented below, in conjunction with the other members (EM resident/PA/NP) of the patient care team.

## 2021-08-20 NOTE — ED PROVIDER NOTE - EXTREMITY EXAM
**ATTENDING ADDENDUM (Dr. Kvng Wing): Symmetrically equal strength, 5/5, in the bilateral upper and lower extremities. NO cords, soft-tissue swelling or calf tenderness in the bilateral lower extremities./right upper extremity findings

## 2021-08-20 NOTE — ED PROVIDER NOTE - OBJECTIVE STATEMENT
64yo female pt with PMHx of HTN, S/P AICD, presents to ED with right shoulder pain for 2-3 weeks. Denies injury. Pt stated the pain's worse with movement. Denies fever, chills, cough or congestion. Denies sensory changes or weakness to extremities. Denies radiating pain. Denies spinal pain. Denies CP/SOB/ABD pain or N/V. 66yo female pt with PMHx of HTN, S/P AICD, presents to ED with right shoulder pain for 2-3 weeks. Denies injury. Pt stated the pain's worse with movement. Denies fever, chills, cough or congestion. Denies sensory changes or weakness to extremities. Denies radiating pain. Denies spinal pain. Denies CP/SOB/ABD pain or N/V.  **ATTENDING ADDENDUM (Dr. Kvng Wing): I attest that I have directly examined this patient and elicited a comparable history of present illness and review of systems with my collaborating provider (NP/PA). I attest that I have made significant contributions to the documentation where necessary and as noted in the EMR.

## 2021-08-20 NOTE — ED PROVIDER NOTE - CHPI ED SYMPTOMS NEG
**ATTENDING ADDENDUM (Dr. Kvng Wing): NO rashes. NO soft-tissue swelling. NO distal discoloration./no abrasion/no back pain/no deformity/no fever/no numbness/no tingling/no weakness/no bruising

## 2021-08-20 NOTE — ED PROVIDER NOTE - CARE PLAN
Goal:	**ATTENDING ADDENDUM (Dr. Kvng Wing): Goals of care include resolution of emergent/urgent symptoms and concerns, and restoration to baseline level of homeostasis.   1 Principal Discharge DX:	Sprain of shoulder, right  Goal:	**ATTENDING ADDENDUM (Dr. Kvng Wing): Goals of care include resolution of emergent/urgent symptoms and concerns, and restoration to baseline level of homeostasis.  Secondary Diagnosis:	Strain of right shoulder  Secondary Diagnosis:	Osteoarthritis

## 2021-08-20 NOTE — ED PROVIDER NOTE - PHYSICAL EXAMINATION
**ATTENDING ADDENDUM (Dr. Kvng Wing): I have reviewed and substantially contributed to the elements of the PE as documented above. I have directly performed an examination of this patient in conjunction with the other members (EM resident/PA/NP) of the patient care team. I have personally reviewed the patient's vital signs at the time of the patient's initial presentation to the ED and repeatedly throughout the ED course. **ATTENDING ADDENDUM (Dr. Kvng Wing): I have reviewed and substantially contributed to the elements of the PE as documented above. I have directly performed an examination of this patient in conjunction with the other members (EM resident/PA/NP) of the patient care team. I have personally reviewed the patient's vital signs at the time of the patient's initial presentation to the ED and repeatedly throughout the ED course.    NAD, VSS, Afebrile, No spinal tender. Lungs clear. + Right shoulder; laterally and arterially tender without obvious swelling or deformities, diminished ROMs. N/V- intact. NAD, VSS, Afebrile, No spinal tender. Lungs clear. + Right shoulder; laterally and arterially tender without obvious swelling or deformities, diminished ROMs. N/V- intact.  **ATTENDING ADDENDUM (Dr. Kvng Wing): I have reviewed and substantially contributed to the elements of the PE as documented above. I have directly performed an examination of this patient in conjunction with the other members (EM resident/PA/NP) of the patient care team. I have personally reviewed the patient's vital signs at the time of the patient's initial presentation to the ED and repeatedly throughout the ED course. Of note, and in addition to the above, the physical examination of the right shoulder demonstrates POSITIVE pain with range of motion, including attempted abduction, external rotation, extension, and internal rotation.

## 2021-08-20 NOTE — ED PROVIDER NOTE - RESPIRATORY, MLM
Breath sounds clear and equal bilaterally. Breath sounds clear and equal bilaterally. **ATTENDING ADDENDUM (Dr. Kvng Wing): NO wheezing, rales, rhonchi, crackles, stridor, drooling, retractions, nasal flaring, or tripoding.

## 2021-08-20 NOTE — ED PROVIDER NOTE - NSFOLLOWUPINSTRUCTIONS_ED_ALL_ED_FT
Thank you for visiting our Emergency Department, it has been a pleasure taking part in your healthcare.    Please follow up with your Primary Doctor in 2-3 days.     Acetaminophen 1000 mg every 6 hours as directed for pain.   Salon pas lidocaine patches 4% as needed once daily.   Physical therapy as prescribed.  Call your doctor for a referral to an orthopedist and for advanced imaging if symptoms persist, worsen or do not resolve.     Sprain    A sprain is a stretch or tear in one of the tough, fiber-like tissues (ligaments) in your body. This is caused by an injury to the area such as a twisting mechanism. Symptoms include pain, swelling, or bruising. Rest that area over the next several days and slowly resume activity when tolerated. Ice can help with swelling and pain.     SEEK IMMEDIATE MEDICAL CARE IF YOU HAVE ANY OF THE FOLLOWING SYMPTOMS: worsening pain, inability to move that body part, numbness or tingling.     Strain    A strain is a stretch or tear in one of the muscles in your body. This is caused by an injury to the area such as a twisting mechanism. Symptoms include pain, swelling, or bruising. Rest that area over the next several days and slowly resume activity when tolerated. Ice can help with swelling and pain.     SEEK IMMEDIATE MEDICAL CARE IF YOU HAVE ANY OF THE FOLLOWING SYMPTOMS: worsening pain, inability to move that body part, numbness or tingling.

## 2021-08-20 NOTE — ED PROVIDER NOTE - CONTEXT
**ATTENDING ADDENDUM (Dr. Kvng Wing): DENIES recent travel. NO sick contacts. NO history of trauma./unknown

## 2021-08-20 NOTE — ED PROVIDER NOTE - NSICDXFAMILYHX_GEN_ALL_CORE_FT
FAMILY HISTORY:  Father  Still living? Unknown  Family history of coronary artery disease, Age at diagnosis: Age Unknown    Mother  Still living? Unknown  Family history of diabetes mellitus, Age at diagnosis: Age Unknown

## 2021-08-20 NOTE — ED PROVIDER NOTE - VASCULAR COMPROMISE
**ATTENDING ADDENDUM (Dr. Kvng Wing): NO clavicular tenderness. NO acromioclavicular tenderness or stepoff./no vascular compromise/pulses full and equal bilaterally

## 2021-08-20 NOTE — ED PROVIDER NOTE - PATIENT PORTAL LINK FT
You can access the FollowMyHealth Patient Portal offered by [facility] by registering at the following website: [email or web address]. By joining [employer]’s FollowMyHealth portal, you will also be able to view your health information using other applications (apps) compatible with our system.

## 2021-08-20 NOTE — ED PROVIDER NOTE - UPPER EXTREMITY EXAM, RIGHT
**ATTENDING ADDENDUM (Dr. Kvng Wing): NO soft-tissue swelling, ecchymosis, or strength deficit./LIMITED ROM/TENDERNESS

## 2021-08-20 NOTE — ED ADULT NURSE NOTE - OBJECTIVE STATEMENT
65F comes to ED c/o right shoulder pain. States she woke up with right shoulder pain 2 weeks ago. She has recent AICD placement 4 weeks ago. She denies any trauma to area. States she has pain with movement and decreased ROM to right shoulder. States her pain originates in the posterior portion of the shoulder and radiates to right axillary area and right bicep. On exam, she has no deformity, no wounds to shoulder, decreased ROM to RUE, +pulse/sensation to affected extremity. She denies any past trauma to arm.

## 2021-08-20 NOTE — ED PROVIDER NOTE - PROGRESS NOTE DETAILS
**ATTENDING ADDENDUM (Dr. Kvng Wing): Agree with goals/plan of ED care as described in EMR, including diagnostics, therapeutics and consultation as clinically warranted. Will continue to observe and monitor closely. Anticipatory guidance provided by ED team at time of initial presentation. **ATTENDING ADDENDUM (Dr. Kvng Wing): Agree with goals/plan of ED care as described in EMR, including diagnostics, therapeutics and consultation as clinically warranted. Will continue to observe and monitor closely. Anticipatory guidance provided by ED team at time of initial presentation. Left-hand dominant. **ATTENDING ADDENDUM (Dr. Kvng Wing): patient serially evaluated throughout ED course by ED team. NO acute deterioration up to this time in the ED. ED diagnostics up to this time acknowledged, reviewed and noted. XR with findings consistent with osteoarthritis (NO evidence of calcific tendonitis, fracture or dislocation at this time). Agree with discharge home with close outpatient followup with primary care physician/provider. Extensive anticipatory guidance provided to patient +/or family member(s) personally by me and other ED team members throughout ED course.

## 2021-08-20 NOTE — ED PROVIDER NOTE - CHIEF COMPLAINT
The patient is a 65y Female complaining of arm pain/injury. The patient is a 65-year-old woman presenting with concern for right shoulder pain/soreness and limited range of motion for the past 2-3 weeks.

## 2021-08-20 NOTE — ED PROVIDER NOTE - SKIN, MLM
Skin normal color for race, warm, dry and intact. No evidence of rash. Skin normal color for race, warm, dry and intact. No evidence of rash. **ATTENDING ADDENDUM (Dr. Kvng Wing): NO rashes, lesions, ulcers, vesicles, erythema, streaking, lymphangitic spread, crepitus, cellulitis, petechiae, purpurae, track marks or ecchymoses.

## 2021-08-20 NOTE — ED PROVIDER NOTE - CLINICAL SUMMARY MEDICAL DECISION MAKING FREE TEXT BOX
**ATTENDING MEDICAL DECISION MAKING/SYNTHESIS (Dr. Kvng Wing): I have reviewed the Chief Concern(s), the HPI, the ROS, and have directly performed and confirmed the findings on the Physical Examination. I have reviewed the medical decision making with all providers, as applicable. The PROBLEM REPRESENTATION at this time is: 65-year-old woman several weeks s/p recent AICD insertion (left shoulder) with 2-3 weeks of right shoulder pain, soreness and decreased range of motion. Reports sharp, shock-like pain with radicular component to the right elbow and medial aspect of right arm. Also with radiation to the right lateral chest wall/axilla. NO numbness, tingling, weakness, or paresthesias. NO focal weakness. POSITIVE pain with range of motion, including attempted abduction, external rotation, extension, and internal rotation. Only tried home remedies without successful relief of pain. NO fevers or chills. NO rashes. NO soft-tissue swelling. NO distal discoloration. The MOST LIKELY DIAGNOSIS, and the LIST OF DIFFERENTIAL DIAGNOSES, includes (but is not limited to) the following: calcific tendonitis, arthritis, impingement syndrome, vascular etiology e.g. deep venous thrombosis, acute arterial insufficiency, or equivalent, other nerve-associated cause, serious bacterial infection or sepsis/severe sepsis e.g. cellulitis, necrotizing fasciitis, compartment syndrome or equivalent, electrolyte-metabolic-endocrine derangements. The likelihood of each of these diagnoses has been appropriately considered in the context of this patient's presentation and my evaluation. PLAN: as described in EMR, including diagnostics, therapeutics and consultation as clinically warranted. I will continue to reevaluate the patient, including the results of all testing, and monitor response to therapy throughout the patient's course in the ED. **ATTENDING MEDICAL DECISION MAKING/SYNTHESIS (Dr. Kvng Wing): I have reviewed the Chief Concern(s), the HPI, the ROS, and have directly performed and confirmed the findings on the Physical Examination. I have reviewed the medical decision making with all providers, as applicable. The PROBLEM REPRESENTATION at this time is: 65-year-old woman several weeks s/p recent AICD insertion (left shoulder) with 2-3 weeks of right shoulder pain, soreness and decreased range of motion. Reports sharp, shock-like pain with radicular component to the right elbow and medial aspect of right arm. Also with radiation to the right lateral chest wall/axilla. NO numbness, tingling, weakness, or paresthesias. NO focal weakness. POSITIVE pain with range of motion, including attempted abduction, external rotation, extension, and internal rotation. Only tried home remedies without successful relief of pain. NO fevers or chills. NO rashes. NO soft-tissue swelling. NO distal discoloration. Left-hand dominant. The MOST LIKELY DIAGNOSIS, and the LIST OF DIFFERENTIAL DIAGNOSES, includes (but is not limited to) the following: calcific tendonitis, arthritis, impingement syndrome, vascular etiology e.g. deep venous thrombosis, acute arterial insufficiency, or equivalent, other nerve-associated cause, serious bacterial infection or sepsis/severe sepsis e.g. cellulitis, necrotizing fasciitis, compartment syndrome or equivalent, electrolyte-metabolic-endocrine derangements. The likelihood of each of these diagnoses has been appropriately considered in the context of this patient's presentation and my evaluation. PLAN: as described in EMR, including diagnostics, therapeutics and consultation as clinically warranted. I will continue to reevaluate the patient, including the results of all testing, and monitor response to therapy throughout the patient's course in the ED. **ATTENDING MEDICAL DECISION MAKING/SYNTHESIS (Dr. Kvng Wing): I have reviewed the Chief Concern(s), the HPI, the ROS, and have directly performed and confirmed the findings on the Physical Examination. I have reviewed the medical decision making with all providers, as applicable. The PROBLEM REPRESENTATION at this time is: 65-year-old woman several weeks s/p recent AICD insertion (left shoulder) with 2-3 weeks of right shoulder pain, soreness and decreased range of motion. Reports sharp, shock-like pain with radicular component to the right elbow and medial aspect of right arm. Also with radiation to the right lateral chest wall/axilla. NO numbness, tingling, weakness, or paresthesias. NO focal weakness. POSITIVE pain with range of motion, including attempted abduction, external rotation, extension, and internal rotation. Only tried home remedies without successful relief of pain. NO fevers or chills. NO rashes. NO soft-tissue swelling. NO distal discoloration. Left-hand dominant. The MOST LIKELY DIAGNOSIS, and the LIST OF DIFFERENTIAL DIAGNOSES, includes (but is not limited to) the following: calcific tendonitis, osteoarthritis, arthritis or inflammation from another occult or actual rheumatologic, autoimmune (e.g. RA, SLE, etc.), bursitis, or other inflammatory process (NO evidence), crystal arthropathy (NO evidence), impingement syndrome, vascular etiology e.g. deep venous thrombosis, acute arterial insufficiency, or equivalent, other nerve-associated cause, serious bacterial infection or sepsis/severe sepsis e.g. cellulitis, necrotizing fasciitis, osteomyelitis, septic bursitis, compartment syndrome or equivalent (NO evidence), electrolyte-metabolic-endocrine derangements. The likelihood of each of these diagnoses has been appropriately considered in the context of this patient's presentation and my evaluation. PLAN: as described in EMR, including diagnostics, therapeutics and consultation as clinically warranted. I will continue to reevaluate the patient, including the results of all testing, and monitor response to therapy throughout the patient's course in the ED.

## 2024-04-02 NOTE — DISCHARGE NOTE ADULT - FUNCTIONAL SCREEN CURRENT LEVEL: BATHING, MLM
(0) independent Number Of Freeze-Thaw Cycles: 2 freeze-thaw cycles Spray Paint Technique: No Spray Paint Text: The liquid nitrogen was applied to the skin utilizing a spray paint frosting technique. Medical Necessity Information: It is in your best interest to select a reason for this procedure from the list below. All of these items fulfill various CMS LCD requirements except the new and changing color options. Detail Level: Detailed Show Applicator Variable?: Yes Pared With?: curette Aperture Size (Optional): C Post-Care Instructions: I reviewed with the patient in detail post-care instructions. Patient is to wear sunprotection, and avoid picking at any of the treated lesions. Pt may apply Vaseline to crusted or scabbing areas. Application Tool (Optional): Liquid Nitrogen Sprayer Medical Necessity Clause: This procedure was medically necessary because the lesions that were treated were: Consent: The patient's consent was obtained including but not limited to risks of crusting, scabbing, blistering, scarring, darker or lighter pigmentary change, recurrence, incomplete removal and infection. Duration Of Freeze Thaw-Cycle (Seconds): 5-15

## 2024-10-19 ENCOUNTER — EMERGENCY (EMERGENCY)
Facility: HOSPITAL | Age: 69
LOS: 1 days | Discharge: HOME | End: 2024-10-19
Attending: STUDENT IN AN ORGANIZED HEALTH CARE EDUCATION/TRAINING PROGRAM
Payer: MEDICARE

## 2024-10-19 VITALS
WEIGHT: 197.98 LBS | SYSTOLIC BLOOD PRESSURE: 132 MMHG | HEIGHT: 66 IN | HEART RATE: 58 BPM | DIASTOLIC BLOOD PRESSURE: 86 MMHG | TEMPERATURE: 98 F | OXYGEN SATURATION: 100 % | RESPIRATION RATE: 18 BRPM

## 2024-10-19 VITALS
TEMPERATURE: 98 F | OXYGEN SATURATION: 98 % | DIASTOLIC BLOOD PRESSURE: 65 MMHG | HEART RATE: 63 BPM | RESPIRATION RATE: 16 BRPM | SYSTOLIC BLOOD PRESSURE: 96 MMHG

## 2024-10-19 LAB
ALBUMIN SERPL ELPH-MCNC: 4.2 G/DL — SIGNIFICANT CHANGE UP (ref 3.3–5)
ALP SERPL-CCNC: 83 U/L — SIGNIFICANT CHANGE UP (ref 40–120)
ALT FLD-CCNC: 11 U/L — SIGNIFICANT CHANGE UP (ref 10–45)
ANION GAP SERPL CALC-SCNC: 8 MMOL/L — SIGNIFICANT CHANGE UP (ref 5–17)
APTT BLD: 36.1 SEC — HIGH (ref 24.5–35.6)
AST SERPL-CCNC: 16 U/L — SIGNIFICANT CHANGE UP (ref 10–40)
BASOPHILS # BLD AUTO: 0.02 K/UL — SIGNIFICANT CHANGE UP (ref 0–0.2)
BASOPHILS NFR BLD AUTO: 0.5 % — SIGNIFICANT CHANGE UP (ref 0–2)
BILIRUB SERPL-MCNC: 0.3 MG/DL — SIGNIFICANT CHANGE UP (ref 0.2–1.2)
BUN SERPL-MCNC: 16 MG/DL — SIGNIFICANT CHANGE UP (ref 7–23)
CALCIUM SERPL-MCNC: 10 MG/DL — SIGNIFICANT CHANGE UP (ref 8.4–10.5)
CHLORIDE SERPL-SCNC: 106 MMOL/L — SIGNIFICANT CHANGE UP (ref 96–108)
CO2 SERPL-SCNC: 28 MMOL/L — SIGNIFICANT CHANGE UP (ref 22–31)
CREAT SERPL-MCNC: 0.8 MG/DL — SIGNIFICANT CHANGE UP (ref 0.5–1.3)
EGFR: 79 ML/MIN/1.73M2 — SIGNIFICANT CHANGE UP
EOSINOPHIL # BLD AUTO: 0.17 K/UL — SIGNIFICANT CHANGE UP (ref 0–0.5)
EOSINOPHIL NFR BLD AUTO: 4 % — SIGNIFICANT CHANGE UP (ref 0–6)
GLUCOSE SERPL-MCNC: 82 MG/DL — SIGNIFICANT CHANGE UP (ref 70–99)
HCT VFR BLD CALC: 32.6 % — LOW (ref 34.5–45)
HGB BLD-MCNC: 10.5 G/DL — LOW (ref 11.5–15.5)
INR BLD: 1.25 RATIO — HIGH (ref 0.85–1.16)
LYMPHOCYTES # BLD AUTO: 2.03 K/UL — SIGNIFICANT CHANGE UP (ref 1–3.3)
LYMPHOCYTES # BLD AUTO: 47.2 % — HIGH (ref 13–44)
MCHC RBC-ENTMCNC: 29.9 PG — SIGNIFICANT CHANGE UP (ref 27–34)
MCHC RBC-ENTMCNC: 32.2 GM/DL — SIGNIFICANT CHANGE UP (ref 32–36)
MCV RBC AUTO: 92.9 FL — SIGNIFICANT CHANGE UP (ref 80–100)
MONOCYTES # BLD AUTO: 0.64 K/UL — SIGNIFICANT CHANGE UP (ref 0–0.9)
MONOCYTES NFR BLD AUTO: 14.9 % — HIGH (ref 2–14)
NEUTROPHILS # BLD AUTO: 1.44 K/UL — LOW (ref 1.8–7.4)
NEUTROPHILS NFR BLD AUTO: 33.4 % — LOW (ref 43–77)
NRBC # BLD: 0 /100 WBCS — SIGNIFICANT CHANGE UP (ref 0–0)
NT-PROBNP SERPL-SCNC: 626 PG/ML — HIGH (ref 0–300)
PLATELET # BLD AUTO: 225 K/UL — SIGNIFICANT CHANGE UP (ref 150–400)
POTASSIUM SERPL-MCNC: 5.1 MMOL/L — SIGNIFICANT CHANGE UP (ref 3.5–5.3)
POTASSIUM SERPL-SCNC: 5.1 MMOL/L — SIGNIFICANT CHANGE UP (ref 3.5–5.3)
PROT SERPL-MCNC: 7.3 G/DL — SIGNIFICANT CHANGE UP (ref 6–8.3)
PROTHROM AB SERPL-ACNC: 14.3 SEC — HIGH (ref 9.9–13.4)
RBC # BLD: 3.51 M/UL — LOW (ref 3.8–5.2)
RBC # FLD: 13.2 % — SIGNIFICANT CHANGE UP (ref 10.3–14.5)
SODIUM SERPL-SCNC: 142 MMOL/L — SIGNIFICANT CHANGE UP (ref 135–145)
TROPONIN T, HIGH SENSITIVITY RESULT: <6 NG/L — SIGNIFICANT CHANGE UP (ref 0–51)
WBC # BLD: 4.3 K/UL — SIGNIFICANT CHANGE UP (ref 3.8–10.5)
WBC # FLD AUTO: 4.3 K/UL — SIGNIFICANT CHANGE UP (ref 3.8–10.5)

## 2024-10-19 PROCEDURE — 71045 X-RAY EXAM CHEST 1 VIEW: CPT

## 2024-10-19 PROCEDURE — 80053 COMPREHEN METABOLIC PANEL: CPT

## 2024-10-19 PROCEDURE — 36000 PLACE NEEDLE IN VEIN: CPT

## 2024-10-19 PROCEDURE — 85730 THROMBOPLASTIN TIME PARTIAL: CPT

## 2024-10-19 PROCEDURE — 83880 ASSAY OF NATRIURETIC PEPTIDE: CPT

## 2024-10-19 PROCEDURE — 71045 X-RAY EXAM CHEST 1 VIEW: CPT | Mod: 26

## 2024-10-19 PROCEDURE — 85025 COMPLETE CBC W/AUTO DIFF WBC: CPT

## 2024-10-19 PROCEDURE — 99285 EMERGENCY DEPT VISIT HI MDM: CPT | Mod: 25

## 2024-10-19 PROCEDURE — 99284 EMERGENCY DEPT VISIT MOD MDM: CPT

## 2024-10-19 PROCEDURE — 85610 PROTHROMBIN TIME: CPT

## 2024-10-19 PROCEDURE — 84484 ASSAY OF TROPONIN QUANT: CPT

## 2024-10-19 NOTE — ED ADULT NURSE REASSESSMENT NOTE - NS ED NURSE REASSESS COMMENT FT1
Olivia received from TAMI Feliz. pt A&Ox4, following commands, speech is clear. pt denies cp, sob, dizziness, NV, weakness, numbness / tingling, headahce at this time. plan of care discussed with pt, verbalizes understanding. IV c/d/i. comfort and safety measures maintained.

## 2024-10-19 NOTE — ED PROVIDER NOTE - PATIENT PORTAL LINK FT
You can access the FollowMyHealth Patient Portal offered by [facility] by registering at the following website: [email or web address]. By joining [employer]’s FollowMyHealth portal, you will also be able to view your health information using other applications (apps) compatible with our system.

## 2024-10-19 NOTE — ED ADULT TRIAGE NOTE - WEIGHT IN KG
Routing refill request to provider for review/approval because:  --As below last blood pressure not at goal and no plan in charting.  --Also based on order history there appears to be a gap in taking the medication.  --I called patient using  [phone]) and Damon says she takes her losartan every day before bed and has not missed.  --Damon says her daughters will call sometime this week to schedule a appointment at [facility].           89.8

## 2024-10-19 NOTE — ED PROVIDER NOTE - ATTENDING CONTRIBUTION TO CARE
I have personally performed a face to face medical and diagnostic evaluation of the patient. I have discussed with and reviewed the Resident's and/or ACP's and/or Medical/PA/NP student's note and agree with the History, ROS, Physical Exam and MDM unless otherwise indicated. A brief summary of my personal evaluation and impression can be found below.     69F PMH nonischemic cardiomyopathy with AICD, on Xarelto, TIA, CAD s/p MI presenting to ED with 1 week of intermittent CP (non exertional, nonpleuritic, nonradiating), no associated SOB. Pt denies n/v, AICD firing. No active CP on my initial ED eval. Pt follows with Dr. Sd Kelley every mo, had recent echo WNL. Unsure when her last stress test was. No new calf pain or swelling. PE showing RRR, lungs cta, well appearing, nontoxic, no pitting edema at LE bl, distal pulses 2, neuro intact. Given hx and physical, ddx includes but is not limited to MSK pain, ACS, metabolic derangement, pneumonia, pulm edema, pleural effusion. Plan for ecg, labs, xr, cardiac monitor, reassess.

## 2024-10-19 NOTE — ED ADULT NURSE NOTE - OBJECTIVE STATEMENT
1400 69 yr old BF c/o sternal chest pain x 1 week associated with SOB. PMH cardiomyopathy, AICD, Pacemaker, MI, TIA. A&Ox4. Denies cough, congestion, fever or chills Pt is on Xarelto. Fall risk precautions maintained.. Skin W&D. Lips and nailbeds pink. Cardiac monitor applied. 12 Lead EKG was done in triage

## 2024-10-19 NOTE — ED PROVIDER NOTE - PROGRESS NOTE DETAILS
Cristian Weinberg MD, PGY3  Labs and imaging nonactionable.  Patient not endorsing any chest pain while in the emergency department.  No shortness of breath.  Patient hemodynamically stable and afebrile.  Offered admission for further cardiac workup however patient would prefer to follow-up outpatient with her cardiologist.  Recent cardiac workup within past 1 to 2 months.  Will plan to discharge home.  Patient will call her cardiologist tomorrow to set up follow-up appointment.  Answered all questions and gave return precautions.

## 2024-10-19 NOTE — ED PROVIDER NOTE - CLINICAL SUMMARY MEDICAL DECISION MAKING FREE TEXT BOX
Cristian Weinberg MD, PGY3  69-year-old female with past medical history of nonischemic cardiomyopathy with AICD, on Xarelto presenting to emergency department with chest pain intermittent in nature x 1 week.  States symptoms worsen when she is working, stressed.  Not associated with exertion.  Follows with cardiologist Dr. Sd Kelley.  Does not endorse any AICD firing.  Episodes of chest pain have been centrally located, described as a pressure, but sometimes sharp sensation that lasts for a couple of seconds to a couple of minutes at a time.  Symptoms have been occurring every day this week.  Last occurred while in waiting room.  Not currently endorsing any active chest pain.  Denies fever, headache, vision change, difficulty breathing, abdominal pain, nausea, vomiting, extremity edema, rash.    Gen: No acute distress  HEENT: EOMI, no nasal discharge, mucous membranes moist  CV: RRR, +S1/S2, no M/R/G, 2+ radial pulses b/l  Resp: CTAB, no W/R/R, no accessory muscle use, no increased work of breathing  GI: Abdomen soft non-distended, NTTP  MSK: No open wounds, no bruising, no LE edema  Neuro: A&Ox4, following commands, moving all four extremities spontaneously  Psych: appropriate mood    In emergency department patient is hemodynamically stable, afebrile.  Patient well-appearing in no acute distress.  Exam unremarkable, as noted above.  Differential including but not limited to ACS, angina, pneumonia, pneumothorax, anemia, electrolyte abnormalities.  Plan to obtain labs, chest x-ray, put on cardiac monitor, EKG.  Disposition pending workup.  Will reassess.

## 2024-10-19 NOTE — ED ADULT NURSE NOTE - NSFALLHARMRISKINTERV_ED_ALL_ED
Assistance OOB with selected safe patient handling equipment if applicable/Assistance with ambulation/Communicate risk of Fall with Harm to all staff, patient, and family/Monitor gait and stability/Provide visual cue: red socks, yellow wristband, yellow gown, etc/Reinforce activity limits and safety measures with patient and family/Bed in lowest position, wheels locked, appropriate side rails in place/Call bell, personal items and telephone in reach/Instruct patient to call for assistance before getting out of bed/chair/stretcher/Non-slip footwear applied when patient is off stretcher/[place] to call system/Physically safe environment - no spills, clutter or unnecessary equipment/Purposeful Proactive Rounding/Room/bathroom lighting operational, light cord in reach

## 2024-10-19 NOTE — ED ADULT NURSE NOTE - CAS ELECT INFOMATION PROVIDED
pt to f/u with cards, if s/s worsen, report back to ED, pt verbalizes understanding./DC instructions

## 2024-12-11 NOTE — ED PROVIDER NOTE - CONSTITUTIONAL NEGATIVE STATEMENT, MLM
-- DO NOT REPLY / DO NOT REPLY ALL --  -- This inbox is not monitored. If this was sent to the wrong provider or department, reroute message to P ECO Reroute pool. --  -- Message is from Engagement Center Operations (ECO) --    General Patient Message: Pt is calling to get her eye prescription to have them puting glasses she already have. Please give her a call   Caller Information       Contact Date/Time Type Contact Phone/Fax    12/11/2024 12:49 PM CST Phone (Incoming) [facility] [phone]            Alternative phone number:     Can a detailed message be left? Yes - Voicemail   Patient has been advised the message will be addressed within 2-3 business days.              Copied from CRM #[id number]. Topic: MW Messaging - MW Patient Request  >> Dec 11, 2024 12:49 PM Carol JOSHUA wrote:  [facility] called requesting to send a general message to clinician.   Verified issue is NOT regarding a symptom(s) requiring routine or emergent triage. Verified another message template type and CRM does not apply.    Selected 'Wrap Up CRM' and created new Telephone Encounter after clicking 'Convert to Clinical Call'. Selected appropriate Reason for Call.  Sent Pt message template and routed as routine priority per Clinician KB page to appropriate clinician pool. Readback full message.  
Spoke with patient, printed and given to  for .   
no fever and no chills.

## 2025-07-11 NOTE — PROVIDER CONTACT NOTE (CRITICAL VALUE NOTIFICATION) - DATE AND TIME:
14-Nov-2017 12:36 Patient identified with two identification factors, Name and Date of Birth.    Chief Complaint   Patient presents with    Follow-up     Physical. Pt reporting having some itching of the skin related to clothes during of two weeks.        /84 (BP Site: Right Upper Arm, Patient Position: Sitting, BP Cuff Size: Large Adult)   Pulse 64   Temp 98.3 °F (36.8 °C) (Oral)   Resp 18   Ht 1.6 m (5' 3\")   Wt 81.1 kg (178 lb 12.8 oz)   LMP 07/07/2025   SpO2 98%   BMI 31.67 kg/m²       1. \"Have you been to the ER, urgent care clinic since your last visit?  Hospitalized since your last visit?\" No     2. \"Have you seen or consulted any other health care providers outside of the [facility] since your last visit?\" No      14-Nov-2017 11:59 body: No axillary adenopathy.      Left upper body: No axillary adenopathy.   Skin:     General: Skin is warm and dry.   Neurological:      General: No focal deficit present.      Mental Status: She is alert. Mental status is at baseline.      Cranial Nerves: No cranial nerve deficit.      Sensory: No sensory deficit.      Motor: No weakness.   Psychiatric:         Mood and Affect: Mood normal.         Behavior: Behavior normal.         Thought Content: Thought content normal.         Judgment: Judgment normal.                7/11/2025    10:57 AM   PHQ-9    Little interest or pleasure in doing things 0   Feeling down, depressed, or hopeless 0   PHQ-2 Score 0   PHQ-9 Total Score 0     Assessment & Plan   1. Well woman exam without gynecological exam  Pap UTD  Pt deferred pelvic exam today.   Routine baseline fasting labs due today.   -     CBC with Auto Differential; Future  -     Comprehensive Metabolic Panel; Future  -     Lipid Panel; Future  2. Vitamin D deficiency - chronic, unstable, not at goal. Awaiting labs for further recommendations.   -     Vitamin D 25 Hydroxy; Future  3. Hypothyroidism due to Hashimoto's thyroiditis - chronic, asymptomatic.   -     TSH; Future  -     T4, Free; Future  4. Prediabetes - chronic, controlled with diet. Stable at goal.  Awaiting routine surveillance.    -     Hemoglobin A1C; Future  -     Comprehensive Metabolic Panel; Future  5. Intertrigo - new, acute, uncomplicated. Empiric topical therapy for topical steroids and antifungals.   -     hydrocortisone 2.5 % cream; Apply topically 2 times daily for 1 wk, then apply daily for 1 wk, then every other day for 1 wk., Disp-28 g, R-1, Normal  -     CLOTRIMAZOLE ANTI-FUNGAL 1 % cream; Apply topically 2 times daily For 7-10 days, Disp-42 g, R-0, DAWNormal    Return in about 1 year (around 7/11/2026) for CPE with pelvic and fasting labs.         An electronic signature was used to authenticate this note.    --Thomas Reed MD